# Patient Record
Sex: MALE | Race: OTHER | NOT HISPANIC OR LATINO | Employment: FULL TIME | URBAN - METROPOLITAN AREA
[De-identification: names, ages, dates, MRNs, and addresses within clinical notes are randomized per-mention and may not be internally consistent; named-entity substitution may affect disease eponyms.]

---

## 2021-04-16 ENCOUNTER — TELEPHONE (OUTPATIENT)
Dept: UROLOGY | Facility: MEDICAL CENTER | Age: 41
End: 2021-04-16

## 2021-04-16 DIAGNOSIS — N28.89 RENAL MASS, RIGHT: Primary | ICD-10-CM

## 2021-04-16 NOTE — TELEPHONE ENCOUNTER
Please Triage - [place]   New Patient-     What is the reason for the patients appointment? Patient called with a  (patient is Deaf)  Patient is having pain on right testicle  Pain is not constant pain   It comes and goes and when he has pain it radiates to the lower back  Patient would like to know how to proceed  He is available any time  Imaging/Lab Results:      Do we accept the patient's insurance or is the patient Self-Pay? Provider & Plan: [employer]  Member ID#: Has the patient had any previous urologist(s)?no       Have patient records been requested?no        Has the patient had any outside testing done?no       Does the patient have a personal history of cancer? No       Patient can be reached at :[phone] (I)

## 2021-04-22 NOTE — TELEPHONE ENCOUNTER
Patient called back with   He is schedule to see Jose Rahman @ [facility] on 5/14  An interperter has been scheduled to come to appt

## 2021-05-14 ENCOUNTER — OFFICE VISIT (OUTPATIENT)
Dept: UROLOGY | Facility: CLINIC | Age: 41
End: 2021-05-14
Payer: COMMERCIAL

## 2021-05-14 VITALS — DIASTOLIC BLOOD PRESSURE: 82 MMHG | HEART RATE: 72 BPM | SYSTOLIC BLOOD PRESSURE: 138 MMHG | WEIGHT: 192 LBS

## 2021-05-14 DIAGNOSIS — N50.811 RIGHT TESTICULAR PAIN: Primary | ICD-10-CM

## 2021-05-14 PROCEDURE — 99204 OFFICE O/P NEW MOD 45 MIN: CPT | Performed by: PHYSICIAN ASSISTANT

## 2021-05-14 NOTE — PROGRESS NOTES
5/14/2021      Chief Complaint   Patient presents with    New Patient Visit     Accompanied by ASL interpretor for the duration of visit- history physical and treatment plan discussion    Assessment and Plan    35 y o  male    1  Right testicular discomfort  - urine dip unremarkable  - US scrotum/testes  - US renal/bladder eval for hydro or distal stone but suspected less likely    Recommended prn nsaids, scrotal support, core stretch/strengthening for pain management  Will communicate with him his US results next week  I have low suspicion for significant pathology  History of Present Illness  Cuong Vazquez is a 35 y o  male here for evaluation of New patient right testicular pain  Sign language interpretor used for duration of visit  onset gradual   duration 6 months; intermittent; not daily 2-3/week   location right testicle    trigger- none   better- none   worse- standing long periods, or lifting   pain- 0/1 out of 10   prior- no   workup - none  Treatment- noneoccaionally take motrin, ice for the back pain; hot/cold compress  Occasional right low back pain too generally present at time of testicular discomfort but also back pain is present on its own at times  Urination is normal  Testicle is not swollen/tender to touch or color change  No recent or remote precipitating injury      Review of Systems   Constitutional: Negative for activity change, appetite change, chills, fever and unexpected weight change  HENT: Positive for hearing loss (deaf)  Eyes: Negative for visual disturbance  Respiratory: Negative  Negative for shortness of breath  Cardiovascular: Negative  Negative for chest pain  Gastrointestinal: Negative for abdominal pain, diarrhea, nausea and vomiting  Endocrine: Negative  Genitourinary: Positive for testicular pain   Negative for decreased urine volume, difficulty urinating, dysuria, flank pain, frequency, hematuria, penile pain, penile swelling, scrotal swelling and urgency  Musculoskeletal: Negative for back pain and gait problem  Skin: Negative  Allergic/Immunologic: Negative  Neurological: Negative  Hematological: Negative for adenopathy  Does not bruise/bleed easily  Vitals  Vitals:    05/14/21 1305   BP: 138/82   BP Location: Right arm   Patient Position: Sitting   Cuff Size: Adult   Pulse: 72   Weight: 87 1 kg (192 lb)       Physical Exam  Vitals signs and nursing note reviewed  Constitutional:       General: He is not in acute distress  Appearance: He is well-developed  He is not diaphoretic  HENT:      Head: Normocephalic and atraumatic  Comments: Deaf- communicates via [address]  todays visit  Pulmonary:      Effort: Pulmonary effort is normal    Abdominal:      Tenderness: There is no right CVA tenderness or left CVA tenderness  Hernia: No hernia is present  Genitourinary:     Comments: Circumcised penis, normal phallus, orthotopic patent meatus  Testes smooth descended bilaterally into the scrotum nontender with no palpable mass  No reproducible tenderness    Skin:     General: Skin is warm and dry  Findings: No rash  Neurological:      Mental Status: He is alert and oriented to person, place, and time  Gait: Gait normal    Psychiatric:         Speech: Speech normal          Behavior: Behavior normal            Past History  No past medical history on file    Social History     Socioeconomic History    Marital status: Unknown     Spouse name: Not on file    Number of children: Not on file    Years of education: Not on file    Highest education level: Not on file   Occupational History    Not on file   Social Needs    Financial resource strain: Not on file    Food insecurity     Worry: Not on file     Inability: Not on file    Transportation needs     Medical: Not on file     Non-medical: Not on file   Tobacco Use    Smoking status: Never Smoker    Smokeless tobacco: Never Used   Substance and Sexual Activity    Alcohol use: Not Currently    Drug use: Never    Sexual activity: Not on file   Lifestyle    Physical activity     Days per week: Not on file     Minutes per session: Not on file    Stress: Not on file   Relationships    Social connections     Talks on phone: Not on file     Gets together: Not on file     Attends Voodoo service: Not on file     Active member of club or organization: Not on file     Attends meetings of clubs or organizations: Not on file     Relationship status: Not on file    Intimate partner violence     Fear of current or ex partner: Not on file     Emotionally abused: Not on file     Physically abused: Not on file     Forced sexual activity: Not on file   Other Topics Concern    Not on file   Social History Narrative    Not on file     Social History     Tobacco Use   Smoking Status Never Smoker   Smokeless Tobacco Never Used     No family history on file  The following portions of the patient's history were reviewed and updated as appropriate: allergies, current medications, past medical history, past social history, past surgical history and problem list     Results  No results found for this or any previous visit (from the past 1 hour(s))  ]  No results found for: PSA  No results found for: GLUCOSE, CALCIUM, NA, K, CO2, CL, BUN, CREATININE  No results found for: WBC, HGB, HCT, MCV, PLT

## 2021-05-24 ENCOUNTER — HOSPITAL ENCOUNTER (OUTPATIENT)
Dept: ULTRASOUND IMAGING | Facility: HOSPITAL | Age: 41
Discharge: HOME | End: 2021-05-24
Payer: COMMERCIAL

## 2021-05-24 DIAGNOSIS — N50.811 RIGHT TESTICULAR PAIN: ICD-10-CM

## 2021-05-24 PROCEDURE — 76870 US EXAM SCROTUM: CPT

## 2021-05-24 PROCEDURE — 76770 US EXAM ABDO BACK WALL COMP: CPT

## 2021-06-03 NOTE — TELEPHONE ENCOUNTER
vn left for pt to call back and review AP recommendations   Also in VM noted that AP sent a message on my chart

## 2021-06-03 NOTE — TELEPHONE ENCOUNTER
US renal and scrotal reviewed  I sent patient a communication on [place] which may be easiest for him as he is hearing impaired  The scrotal US is normal  The renal ultrasound shows a right renal lesion needs further workup with MRI    I placed orders  Can we try to reach out with his  as prior to also let him know and make sure he received my message and help schedule thank you

## 2021-06-08 NOTE — TELEPHONE ENCOUNTER
Left message though the  number [phone]  Informed patient that he needs to obtain a MRI and left the number to schedule the MRI  Told patient that an lesion was seen on the resent ultrasound  His scrotal ultrasound was normal   We also sent this message though his mychart  He can call the office for further questions

## 2021-06-09 NOTE — TELEPHONE ENCOUNTER
LUCY for pt notifying he has a test that needs to be scheduled  Mailing pt the MRI script with Central scheduling's number, so pt can call when able

## 2021-06-11 ENCOUNTER — TELEPHONE (OUTPATIENT)
Dept: UROLOGY | Facility: MEDICAL CENTER | Age: 41
End: 2021-06-11

## 2021-06-11 NOTE — TELEPHONE ENCOUNTER
Regarding: RE:urology results  Contact: [phone]  ----- Message from Mehrdad Leo sent at 6/10/2021  2:17 PM EDT -----  Please help patient schedule MRI     ----- Message from Ledy Brown to Jeanie Murillo PA-C sent at 6/10/2021  7:37 AM -----   Hi,  Yes, I got the letter regarding it  Do I need to stop by the same [address] [place] [address] Dr again this month? I can do next Monday 14th or Tuesday 15th anytime  Please let me know  Thank you very much  Fish Jurado      ----- Message -----       From:Mili Olson PA-C       Sent:6/3/2021  1:14 PM EDT         To:Parker Rahman    Subject:urology results    Good afternoon Fish Jurado though I might reach you on here easier  Wanted to let you know your ultrasound testicles/scrotum looks normal  Small cyst on the left testicle is nothing to worry about  No masses or tumors and excellent blood flow  The kidney ultrasound no significant stones but the right side has a cyst vs fatty tumor hard to see on the ultrasound  The radiology team has recommended an MRI of the kidneys to rule out any more dangerous tumors  I will try calling today as well, just wanted you to have this written down for ease  Will communicate again soon        Mariaelena Duran  Urology [address] [facility]  [phone]

## 2021-07-15 ENCOUNTER — APPOINTMENT (OUTPATIENT)
Dept: LAB | Facility: HOSPITAL | Age: 41
End: 2021-07-15
Payer: COMMERCIAL

## 2021-07-15 ENCOUNTER — HOSPITAL ENCOUNTER (OUTPATIENT)
Dept: MRI IMAGING | Facility: HOSPITAL | Age: 41
Discharge: HOME | End: 2021-07-15
Payer: COMMERCIAL

## 2021-07-15 DIAGNOSIS — N28.89 RENAL MASS, RIGHT: ICD-10-CM

## 2021-07-15 LAB
ANION GAP SERPL CALCULATED.3IONS-SCNC: 7 MMOL/L (ref 4–13)
BUN SERPL-MCNC: 11 MG/DL (ref 5–25)
CALCIUM SERPL-MCNC: 8.7 MG/DL (ref 8.3–10.1)
CHLORIDE SERPL-SCNC: 105 MMOL/L (ref 100–108)
CO2 SERPL-SCNC: 30 MMOL/L (ref 21–32)
CREAT SERPL-MCNC: 1.05 MG/DL (ref 0.6–1.3)
GFR SERPL CREATININE-BSD FRML MDRD: 87 ML/MIN/1.73SQ M
GLUCOSE SERPL-MCNC: 105 MG/DL (ref 65–140)
POTASSIUM SERPL-SCNC: 3.9 MMOL/L (ref 3.5–5.3)
SODIUM SERPL-SCNC: 142 MMOL/L (ref 136–145)

## 2021-07-15 PROCEDURE — 80048 BASIC METABOLIC PNL TOTAL CA: CPT

## 2021-07-15 PROCEDURE — 36415 COLL VENOUS BLD VENIPUNCTURE: CPT

## 2021-07-15 NOTE — TELEPHONE ENCOUNTER
Call placed to [facility] from Radiology to see if it would be possible to order stat labs for patient and have them drawn prior to appointment  Unfortunately, labs would not be processed in time prior to 1:45 MRI appointment  Attempted to contact patient and patient's , however both numbers needed to be connected through a secondhand source and they were having trouble connecting  Unable to leave voicemail at this time  Will attempt to contact patient again to have MRI re-scheduled

## 2021-07-15 NOTE — TELEPHONE ENCOUNTER
Jaclyn from Radiology calling in regards to patients appointment for today at 1:45 pm   No BUN or [place] on file  Please advise how to proceed    Please call [phone]

## 2021-07-20 ENCOUNTER — HOSPITAL ENCOUNTER (OUTPATIENT)
Dept: MRI IMAGING | Facility: HOSPITAL | Age: 41
Discharge: HOME | End: 2021-07-20
Payer: COMMERCIAL

## 2021-07-20 PROCEDURE — A9585 GADOBUTROL INJECTION: HCPCS | Performed by: PHYSICIAN ASSISTANT

## 2021-07-20 PROCEDURE — G1004 CDSM NDSC: HCPCS

## 2021-07-20 PROCEDURE — 74183 MRI ABD W/O CNTR FLWD CNTR: CPT

## 2021-07-20 RX ADMIN — GADOBUTROL 8 ML: 604.72 INJECTION INTRAVENOUS at 11:17

## 2021-07-26 ENCOUNTER — TELEPHONE (OUTPATIENT)
Dept: UROLOGY | Facility: AMBULATORY SURGERY CENTER | Age: 41
End: 2021-07-26

## 2021-07-26 NOTE — TELEPHONE ENCOUNTER
Received call from radiology reading room to report significant findings on patient's 7-20-21 MRI      *Patient has no upcoming follow ups*

## 2021-07-27 NOTE — TELEPHONE ENCOUNTER
Please have pt schedule follow up appointment with on e of the physicians to discuss results of MRI and plan of care

## 2021-08-05 NOTE — TELEPHONE ENCOUNTER
Call placed to patient and mother, however both unavailable at this time  Will attempt at a later time

## 2021-08-09 NOTE — TELEPHONE ENCOUNTER
Called [phone] and left a message for patient stating to please contact the office to speak with clinical to schedule a MD follow up for MRI results  Appointment needs to be 30 minutes

## 2021-08-10 ENCOUNTER — TELEPHONE (OUTPATIENT)
Dept: UROLOGY | Facility: CLINIC | Age: 41
End: 2021-08-10

## 2021-08-10 NOTE — TELEPHONE ENCOUNTER
Patient scheduled with Dr Lesly Marx on 08/27/2021 at 1100 at the [facility] office  Please arrange for interpretor to be present for the visit    Thanks

## 2021-08-10 NOTE — TELEPHONE ENCOUNTER
Received phone call from  services  for patient  Patient scheduled 08/27/2021 at 1100 with Dr Raquel Miguel at the [facility] office  Encounter sent to clerical to arrange interpretor services for patient's visit

## 2021-08-10 NOTE — TELEPHONE ENCOUNTER
Spoke with Maryjane Gong at [address] [facility] and arranged for interpretor for visit

## 2021-08-10 NOTE — TELEPHONE ENCOUNTER
Pt called thru relay system stating he was unavailable 8/27 with Yesi Vasquez I informed him I could not accommodate with rescheduling

## 2021-08-10 NOTE — TELEPHONE ENCOUNTER
----- Message from Brenda Fernandez sent at 8/9/2021  2:34 PM EDT -----  Regarding: RE:MRI results  Contact: [phone]  Hello,  Thanks for the message  Yes, I would like to stop by the office next week, preferably on Monday or Tuesday  Anytime is fine with me  Thank you    Susi Zelaya

## 2021-08-11 NOTE — TELEPHONE ENCOUNTER
Called and left message with   Informed patient that I received his message about needing to reschedule appointment for 08/27 to 08/31  Office number left in message

## 2021-08-17 NOTE — TELEPHONE ENCOUNTER
Called [facility] at [phone] ext [phone] and spoke with Librado Cuenca and notified her of appointment change to 8/31/21 for a 11:15 arrival   Librado Cuenca explained that she would notifiy the  that was assigned to this case

## 2021-08-30 NOTE — PROGRESS NOTES
UROLOGY ROUTINE FOLLOW UP NOTE     CHIEF COMPLAINT   Lorraine Braga is a 35 y o  male with a complaint of No chief complaint on file  History of Present Illness:     35 y o  male with hearing impairment, accompanied by ASL   Patient intially presented with testicular pain  Patient had a scrotal and renal ultrasound as part of his index workup  Renal ultrasound demonstrated concern for a potential angiomyolipoma of the right kidney  MRI was performed in follow-up  Unfortunately this study is significantly limited due to respiratory motion  He presents today to discuss the results  He is no longer having significant discomfort or difficulty with his testicles and groin  Past Medical History:   No past medical history on file  PAST SURGICAL HISTORY:   No past surgical history on file  CURRENT MEDICATIONS:     No current outpatient medications on file  No current facility-administered medications for this visit  ALLERGIES:   No Known Allergies    SOCIAL HISTORY:     Social History     Socioeconomic History    Marital status: Single     Spouse name: Not on file    Number of children: Not on file    Years of education: Not on file    Highest education level: Not on file   Occupational History    Not on file   Tobacco Use    Smoking status: Never Smoker    Smokeless tobacco: Never Used   Vaping Use    Vaping Use: Never used   Substance and Sexual Activity    Alcohol use: Not Currently    Drug use: Never    Sexual activity: Not on file   Other Topics Concern    Not on file   Social History Narrative    Not on file     Social Determinants of Health     Financial Resource Strain:     Difficulty of Paying Living Expenses:    Food Insecurity:     Worried About Running Out of Food in the Last Year:     [address] in the Last Year:    Transportation Needs:     Lack of Transportation (Medical):      Lack of Transportation (Non-Medical):    Physical Activity:     Days of Exercise per Week:     Minutes of Exercise per Session:    Stress:     Feeling of Stress :    Social Connections:     Frequency of Communication with Friends and Family:     Frequency of Social Gatherings with Friends and Family:     Attends Evangelical Services:     Active Member of Clubs or Organizations:     Attends Club or Organization Meetings:     Marital Status:    Intimate Partner Violence:     Fear of Current or Ex-Partner:     Emotionally Abused:     Physically Abused:     Sexually Abused:        SOCIAL HISTORY:   No family history on file  REVIEW OF SYSTEMS:     Review of Systems   Constitutional: Negative  Respiratory: Negative  Cardiovascular: Negative  Gastrointestinal: Negative  Genitourinary: Negative  Musculoskeletal: Negative  Skin: Negative  Psychiatric/Behavioral: Negative  PHYSICAL EXAM:     There were no vitals taken for this visit  General:  Healthy appearing male in no acute distress  They have a normal affect  Hearing impaired  HEENT:  Normocephalic, atraumatic  Neck is supple without any palpable lymphadenopathy  Cardiovascular:  Patient has normal palpable distal radial pulses  There is no significant peripheral edema  No JVD is noted  Respiratory:  Patient has unlabored respirations  There is no audible wheeze or rhonchi  Abdomen:   Abdomen is soft and nontender  There is no tympany  Inguinal and umbilical hernia are not appreciated  Musculoskeletal:  Patient does not have significant CVA tenderness in the  flank with palpation or percussion  They full range of motion in all 4 extremities  Strength in all 4 extremities appears congruent  Patient is able to ambulate without assistance or difficulty  Dermatologic:  Patient has no skin abnormalities or rashes        LABS:     CBC: No results found for: WBC, HGB, HCT, MCV, PLT    BMP:   Lab Results   Component Value Date    CALCIUM 8 7 07/15/2021    K 3 9 07/15/2021    CO2 30 07/15/2021     07/15/2021    BUN 11 07/15/2021    CREATININE 1 05 07/15/2021       IMAGIN/20/21  MRI - ABDOMEN - WITH AND WITHOUT CONTRAST     INDICATION:  Evaluate right renal mass seen on ultrasound      COMPARISON: Renal ultrasound 2021     TECHNIQUE:  The following pulse sequences were obtained prior to and following the administration of intravenous contrast:  Coronal and axial T2 with TE of 90 and 180 respectively, axial T2 with fat saturation, axial FIESTA fat-sat, axial T1-weighted   in-and-out-of phase, axial DWI/ADC, precontrast axial T1 with fat saturation, post-contrast dynamic axial T1 with fat saturation at 20, 70, and 180 seconds, coronal T1 with fat saturation and 7 minute delayed axial T1 with fat saturation  Pre- and   postcontrast subtraction images were also obtained  Coronal MRCP sequence was also provided      IV Contrast:  8 mL of Gadobutrol injection (SINGLE-DOSE)      FINDINGS:     Unfortunately, the study is degraded by respiratory motion      LOWER CHEST:   Unremarkable      LIVER:   Normal in size and configuration  No suspicious mass  Small hepatic cysts are seen  The hepatic veins and portal veins are patent      BILE DUCTS: No intrahepatic or extrahepatic bile duct dilation       GALLBLADDER:  Cholelithiasis      PANCREAS: Normal  No main pancreatic ductal dilation      ADRENAL GLANDS:  Normal      SPLEEN:  Normal      KIDNEYS/PROXIMAL URETERS:   In the anterior upper pole right kidney is a 1 6 x 1 6 cm slightly T2 hyperintense nodule corresponding to recent ultrasound finding  The nodule is iso- to hypointense on T1 and demonstrates internal enhancement on postcontrast imaging  There is no   definitive macroscopic or microscopic fat appreciated within the lesion although images are again degraded by respiratory motion      Tiny cortical cyst mid to lower left kidney    Lower pole left renal calculus suggested on prior ultrasound may not be visible by MRI      BOWEL:   No dilated loops of bowel       PERITONEUM/RETROPERITONEUM: No ascites      LYMPH NODES: No pathologic lymphadenopathy      VASCULAR STRUCTURES:  No aneurysm      ABDOMINAL WALL:  Unremarkable      OSSEOUS STRUCTURES:  No suspicious osseous lesion         IMPRESSION:     Unfortunately, the study is significantly limited by respiratory motion  1 6 cm upper pole right renal nodule remains indeterminate, however, does demonstrate internal enhancement consistent with at least a partially solid lesion  Possibility of renal   cell carcinoma remains in the differential as does lipid poor angiomyolipoma, adenoma as well as oncocytoma  I would recommend a follow-up CT abdomen with IV contrast in 3 months utilizing renal protocol as CT is less susceptible to respiratory motion      Tiny hepatic and left renal cysts      Cholelithiasis  5/24/21  RENAL ULTRASOUND     INDICATION:   N50 811: Right testicular pain      COMPARISON: None     TECHNIQUE:   Ultrasound of the retroperitoneum was performed with a curvilinear transducer utilizing volumetric sweeps and still imaging techniques       FINDINGS:     KIDNEYS:  Symmetric and normal size  Right kidney:  11 3 cm  Left kidney:  11 7 cm      Right kidney  Normal echogenicity and contour  Nonshadowing avascular hyperechoic focus in the upper pole cortex measuring 1 7 x 1 8 x 1 6 cm  No hydronephrosis  No shadowing calculi  No perinephric fluid collections      Left kidney  Normal echogenicity and contour  No suspicious masses detected  No hydronephrosis  4 mm calculus in the lower pole  No perinephric fluid collections      URETERS:  Nonvisualized      BLADDER:   Normally distended  No focal thickening or mass lesions    Bilateral ureteral jets detected         IMPRESSION:     1 8 cm echogenic lesion in the right kidney, suggestive of angiomyolipoma however recommend follow-up MRI to exclude fat-containing renal cell carcinoma      Nonobstructing left renal calculus  ASSESSMENT:     35 y o  male with   1 8 cm right renal lesion with suspicion for benign angiomyolipoma    PLAN:     The MRI was complicated by respiratory motion  There is question of enhancement however there continues to be the issue and concern raised for a benign pathology such is oncocytoma or angiomyolipoma  As such, I have recommended against surgical scheduling at this time and agree that a repeat image in 3 months is warranted  CT scan will have less variability with motion which may be challenging for the patient given his hearing impairment  I would see him back after the updated CT scan

## 2021-08-31 ENCOUNTER — TELEPHONE (OUTPATIENT)
Dept: UROLOGY | Facility: CLINIC | Age: 41
End: 2021-08-31

## 2021-08-31 ENCOUNTER — OFFICE VISIT (OUTPATIENT)
Dept: UROLOGY | Facility: CLINIC | Age: 41
End: 2021-08-31
Payer: COMMERCIAL

## 2021-08-31 VITALS — HEART RATE: 72 BPM | SYSTOLIC BLOOD PRESSURE: 122 MMHG | DIASTOLIC BLOOD PRESSURE: 82 MMHG | WEIGHT: 205 LBS

## 2021-08-31 DIAGNOSIS — N28.89 RENAL MASS, RIGHT: Primary | ICD-10-CM

## 2021-08-31 PROCEDURE — 99214 OFFICE O/P EST MOD 30 MIN: CPT | Performed by: UROLOGY

## 2021-11-23 ENCOUNTER — APPOINTMENT (OUTPATIENT)
Dept: LAB | Facility: HOSPITAL | Age: 41
End: 2021-11-23
Attending: UROLOGY
Payer: COMMERCIAL

## 2021-11-23 DIAGNOSIS — N28.89 RENAL MASS, RIGHT: ICD-10-CM

## 2021-11-23 LAB
ANION GAP SERPL CALCULATED.3IONS-SCNC: 8 MMOL/L (ref 4–13)
BUN SERPL-MCNC: 15 MG/DL (ref 5–25)
CALCIUM SERPL-MCNC: 8.7 MG/DL (ref 8.3–10.1)
CHLORIDE SERPL-SCNC: 105 MMOL/L (ref 100–108)
CO2 SERPL-SCNC: 29 MMOL/L (ref 21–32)
CREAT SERPL-MCNC: 1.04 MG/DL (ref 0.6–1.3)
GFR SERPL CREATININE-BSD FRML MDRD: 88 ML/MIN/1.73SQ M
GLUCOSE SERPL-MCNC: 108 MG/DL (ref 65–140)
POTASSIUM SERPL-SCNC: 4.5 MMOL/L (ref 3.5–5.3)
SODIUM SERPL-SCNC: 142 MMOL/L (ref 136–145)

## 2021-11-23 PROCEDURE — 80048 BASIC METABOLIC PNL TOTAL CA: CPT

## 2021-11-23 PROCEDURE — 36415 COLL VENOUS BLD VENIPUNCTURE: CPT

## 2021-11-30 ENCOUNTER — HOSPITAL ENCOUNTER (OUTPATIENT)
Dept: CT IMAGING | Facility: HOSPITAL | Age: 41
Discharge: HOME | End: 2021-11-30
Attending: UROLOGY
Payer: COMMERCIAL

## 2021-11-30 DIAGNOSIS — N28.89 RENAL MASS, RIGHT: ICD-10-CM

## 2021-11-30 PROCEDURE — G1004 CDSM NDSC: HCPCS

## 2021-11-30 PROCEDURE — 74170 CT ABD WO CNTRST FLWD CNTRST: CPT

## 2021-11-30 RX ADMIN — IOHEXOL 100 ML: 350 INJECTION, SOLUTION INTRAVENOUS at 08:28

## 2021-12-03 ENCOUNTER — TELEPHONE (OUTPATIENT)
Dept: UROLOGY | Facility: AMBULATORY SURGERY CENTER | Age: 41
End: 2021-12-03

## 2021-12-03 NOTE — TELEPHONE ENCOUNTER
Please switch to ZP schedule for Tuesday 12/7   i'll swap him his 945 patient so there is a space for ZP to see El [place] for CT results thanks

## 2021-12-07 ENCOUNTER — APPOINTMENT (OUTPATIENT)
Dept: RADIOLOGY | Facility: MEDICAL CENTER | Age: 41
End: 2021-12-07
Payer: COMMERCIAL

## 2021-12-07 ENCOUNTER — OFFICE VISIT (OUTPATIENT)
Dept: UROLOGY | Facility: CLINIC | Age: 41
End: 2021-12-07
Payer: COMMERCIAL

## 2021-12-07 VITALS
SYSTOLIC BLOOD PRESSURE: 128 MMHG | BODY MASS INDEX: 25.8 KG/M2 | HEART RATE: 82 BPM | WEIGHT: 201 LBS | HEIGHT: 74 IN | DIASTOLIC BLOOD PRESSURE: 88 MMHG

## 2021-12-07 DIAGNOSIS — N28.89 RIGHT RENAL MASS: ICD-10-CM

## 2021-12-07 DIAGNOSIS — N28.89 RENAL MASS, RIGHT: ICD-10-CM

## 2021-12-07 DIAGNOSIS — N28.89 RIGHT RENAL MASS: Primary | ICD-10-CM

## 2021-12-07 PROCEDURE — 71046 X-RAY EXAM CHEST 2 VIEWS: CPT

## 2021-12-07 PROCEDURE — 99215 OFFICE O/P EST HI 40 MIN: CPT | Performed by: UROLOGY

## 2021-12-07 RX ORDER — RIBOFLAVIN (VITAMIN B2) 100 MG
100 TABLET ORAL DAILY
COMMUNITY

## 2021-12-07 RX ORDER — DIPHENOXYLATE HYDROCHLORIDE AND ATROPINE SULFATE 2.5; .025 MG/1; MG/1
1 TABLET ORAL DAILY
COMMUNITY

## 2021-12-07 RX ORDER — CEFAZOLIN SODIUM 2 G/50ML
2000 SOLUTION INTRAVENOUS ONCE
Status: CANCELLED | OUTPATIENT
Start: 2022-04-25 | End: 2021-12-07

## 2021-12-07 RX ORDER — MULTIVIT WITH MINERALS/LUTEIN
1000 TABLET ORAL DAILY
COMMUNITY

## 2022-01-12 ENCOUNTER — TELEPHONE (OUTPATIENT)
Dept: UROLOGY | Facility: CLINIC | Age: 42
End: 2022-01-12

## 2022-01-12 NOTE — TELEPHONE ENCOUNTER
2nd message left on voice mail for patient to call me back to schedule his surgery  Patient to call me at [phone]

## 2022-01-19 NOTE — TELEPHONE ENCOUNTER
Called patient and left a voicemail for patient to call me back to schedule their surgery  Patient to call me at [phone]

## 2022-01-24 ENCOUNTER — PREP FOR PROCEDURE (OUTPATIENT)
Dept: UROLOGY | Facility: CLINIC | Age: 42
End: 2022-01-24

## 2022-01-24 DIAGNOSIS — N28.89 RIGHT RENAL MASS: Primary | ICD-10-CM

## 2022-01-24 NOTE — TELEPHONE ENCOUNTER
Called and spoke with patient to schedule sx  Patient informed of all PAT's needed prior to sx and advised to stop any anticoagulant medication including Multi-vitamins, Fish oil, Krill oil and NSAID, Patient has also been inform that the hospital will call the day before sx with arrival time and procedure time  Patient also informed to have a  bring them to and from hospital     Sx Date: 04/25  Location: [facility]  Physician: BARTOLO/ Jeevan  H&P Date:    Clearance Date: called Dr Paul Alvarenga office to arrange Appointment/ left message with answering service    Consent: signed  Pre-cert Added to  list on : 01/24    PAT's Ordered: Cbc/ cmp/ ptt/ inr/t/s/ ucx/ ekg/ RBC to be done 4/11    Covid test ordered and need to be done on 4/21

## 2022-02-04 NOTE — TELEPHONE ENCOUNTER
Just spoke with/ Dr Jensen Ford office and scheduled medical clearance for 03/28/2022 at 10:20 am  I will call patient to inform of time and date of Appointment

## 2022-04-11 ENCOUNTER — ANESTHESIA EVENT (OUTPATIENT)
Dept: PERIOP | Facility: HOSPITAL | Age: 42
DRG: 658 | End: 2022-04-11
Payer: COMMERCIAL

## 2022-04-11 ENCOUNTER — HOSPITAL ENCOUNTER (OUTPATIENT)
Dept: NON INVASIVE DIAGNOSTICS | Facility: HOSPITAL | Age: 42
Discharge: HOME | End: 2022-04-11
Attending: UROLOGY
Payer: COMMERCIAL

## 2022-04-11 ENCOUNTER — LAB (OUTPATIENT)
Dept: LAB | Facility: HOSPITAL | Age: 42
End: 2022-04-11
Attending: UROLOGY
Payer: COMMERCIAL

## 2022-04-11 DIAGNOSIS — N28.89 RIGHT RENAL MASS: ICD-10-CM

## 2022-04-11 LAB
ABO GROUP BLD: NORMAL
ALBUMIN SERPL BCP-MCNC: 3.8 G/DL (ref 3.5–5)
ALP SERPL-CCNC: 48 U/L (ref 46–116)
ALT SERPL W P-5'-P-CCNC: 19 U/L (ref 12–78)
ANION GAP SERPL CALCULATED.3IONS-SCNC: 8 MMOL/L (ref 4–13)
APTT PPP: 28 SECONDS (ref 23–37)
AST SERPL W P-5'-P-CCNC: 14 U/L (ref 5–45)
ATRIAL RATE: 73 BPM
BASOPHILS # BLD AUTO: 0.03 THOUSANDS/ΜL (ref 0–0.1)
BASOPHILS NFR BLD AUTO: 1 % (ref 0–1)
BILIRUB SERPL-MCNC: 0.76 MG/DL (ref 0.2–1)
BLD GP AB SCN SERPL QL: NEGATIVE
BUN SERPL-MCNC: 12 MG/DL (ref 5–25)
CALCIUM SERPL-MCNC: 8.5 MG/DL (ref 8.3–10.1)
CHLORIDE SERPL-SCNC: 103 MMOL/L (ref 100–108)
CO2 SERPL-SCNC: 29 MMOL/L (ref 21–32)
CREAT SERPL-MCNC: 1 MG/DL (ref 0.6–1.3)
EOSINOPHIL # BLD AUTO: 0.15 THOUSAND/ΜL (ref 0–0.61)
EOSINOPHIL NFR BLD AUTO: 3 % (ref 0–6)
ERYTHROCYTE [DISTWIDTH] IN BLOOD BY AUTOMATED COUNT: 11.9 % (ref 11.6–15.1)
GFR SERPL CREATININE-BSD FRML MDRD: 91 ML/MIN/1.73SQ M
GLUCOSE SERPL-MCNC: 103 MG/DL (ref 65–140)
HCT VFR BLD AUTO: 41.5 % (ref 36.5–49.3)
HGB BLD-MCNC: 14.4 G/DL (ref 12–17)
IMM GRANULOCYTES # BLD AUTO: 0.01 THOUSAND/UL (ref 0–0.2)
IMM GRANULOCYTES NFR BLD AUTO: 0 % (ref 0–2)
INR PPP: 0.97 (ref 0.84–1.19)
LYMPHOCYTES # BLD AUTO: 1.91 THOUSANDS/ΜL (ref 0.6–4.47)
LYMPHOCYTES NFR BLD AUTO: 40 % (ref 14–44)
MCH RBC QN AUTO: 30.3 PG (ref 26.8–34.3)
MCHC RBC AUTO-ENTMCNC: 34.7 G/DL (ref 31.4–37.4)
MCV RBC AUTO: 87 FL (ref 82–98)
MONOCYTES # BLD AUTO: 0.75 THOUSAND/ΜL (ref 0.17–1.22)
MONOCYTES NFR BLD AUTO: 16 % (ref 4–12)
NEUTROPHILS # BLD AUTO: 1.99 THOUSANDS/ΜL (ref 1.85–7.62)
NEUTS SEG NFR BLD AUTO: 40 % (ref 43–75)
NRBC BLD AUTO-RTO: 0 /100 WBCS
P AXIS: 38 DEGREES
PLATELET # BLD AUTO: 193 THOUSANDS/UL (ref 149–390)
PMV BLD AUTO: 10.2 FL (ref 8.9–12.7)
POTASSIUM SERPL-SCNC: 3.9 MMOL/L (ref 3.5–5.3)
PR INTERVAL: 170 MS
PROT SERPL-MCNC: 7.2 G/DL (ref 6.4–8.2)
PROTHROMBIN TIME: 12.6 SECONDS (ref 11.6–14.5)
QRS AXIS: -65 DEGREES
QRSD INTERVAL: 90 MS
QT INTERVAL: 390 MS
QTC INTERVAL: 429 MS
RBC # BLD AUTO: 4.75 MILLION/UL (ref 3.88–5.62)
RH BLD: POSITIVE
SODIUM SERPL-SCNC: 140 MMOL/L (ref 136–145)
SPECIMEN EXPIRATION DATE: NORMAL
T WAVE AXIS: 58 DEGREES
VENTRICULAR RATE: 73 BPM
WBC # BLD AUTO: 4.84 THOUSAND/UL (ref 4.31–10.16)

## 2022-04-11 PROCEDURE — 80053 COMPREHEN METABOLIC PANEL: CPT

## 2022-04-11 PROCEDURE — 87086 URINE CULTURE/COLONY COUNT: CPT

## 2022-04-11 PROCEDURE — 85610 PROTHROMBIN TIME: CPT

## 2022-04-11 PROCEDURE — 36415 COLL VENOUS BLD VENIPUNCTURE: CPT

## 2022-04-11 PROCEDURE — 93010 ELECTROCARDIOGRAM REPORT: CPT | Performed by: INTERNAL MEDICINE

## 2022-04-11 PROCEDURE — 85730 THROMBOPLASTIN TIME PARTIAL: CPT

## 2022-04-11 PROCEDURE — 86900 BLOOD TYPING SEROLOGIC ABO: CPT

## 2022-04-11 PROCEDURE — 86901 BLOOD TYPING SEROLOGIC RH(D): CPT

## 2022-04-11 PROCEDURE — 85025 COMPLETE CBC W/AUTO DIFF WBC: CPT

## 2022-04-11 PROCEDURE — 93005 ELECTROCARDIOGRAM TRACING: CPT

## 2022-04-11 PROCEDURE — 86850 RBC ANTIBODY SCREEN: CPT

## 2022-04-11 NOTE — PRE-PROCEDURE INSTRUCTIONS
Pre-Surgery Instructions:   Medication Instructions    Ascorbic Acid (vitamin C) 100 MG tablet Instructed patient per Anesthesia Guidelines  stopped for surgery    multivitamin (THERAGRAN) TABS Instructed patient per Anesthesia Guidelines  stopped for surgery    vitamin E, tocopherol, 1,000 units capsule Instructed patient per Anesthesia Guidelines  stopped for surgery   Pre procedure instructions given, verbalizes understanding  NPO after MN  Bathing reviewed  Interview via   [facility] notified of services needed dos  [facility] notified of surgical date  Stop supplements/vitamins   No NSAIDS

## 2022-04-12 LAB — BACTERIA UR CULT: NORMAL

## 2022-04-21 ENCOUNTER — TELEPHONE (OUTPATIENT)
Dept: OTHER | Facility: OTHER | Age: 42
End: 2022-04-21

## 2022-04-21 DIAGNOSIS — N28.9 LESION OF RIGHT NATIVE KIDNEY: Primary | ICD-10-CM

## 2022-04-21 PROCEDURE — U0005 INFEC AGEN DETEC AMPLI PROBE: HCPCS | Performed by: UROLOGY

## 2022-04-21 PROCEDURE — U0003 INFECTIOUS AGENT DETECTION BY NUCLEIC ACID (DNA OR RNA); SEVERE ACUTE RESPIRATORY SYNDROME CORONAVIRUS 2 (SARS-COV-2) (CORONAVIRUS DISEASE [COVID-19]), AMPLIFIED PROBE TECHNIQUE, MAKING USE OF HIGH THROUGHPUT TECHNOLOGIES AS DESCRIBED BY CMS-2020-01-R: HCPCS | Performed by: UROLOGY

## 2022-04-21 NOTE — TELEPHONE ENCOUNTER
Patient's PAT Covid lab order was cancelled and Elizabet Trammell from [facility] called to have lab re-entered in system for patient to have test done while he is at the lab  Patient is mute and personnel were having trouble communicating with patient  Lab ordered re-entered

## 2022-04-22 LAB — SARS-COV-2 RNA RESP QL NAA+PROBE: NEGATIVE

## 2022-04-25 ENCOUNTER — HOSPITAL ENCOUNTER (INPATIENT)
Facility: HOSPITAL | Age: 42
LOS: 1 days | Discharge: HOME | DRG: 658 | End: 2022-04-26
Attending: UROLOGY | Admitting: UROLOGY
Payer: COMMERCIAL

## 2022-04-25 ENCOUNTER — ANESTHESIA (OUTPATIENT)
Dept: PERIOP | Facility: HOSPITAL | Age: 42
DRG: 658 | End: 2022-04-25
Payer: COMMERCIAL

## 2022-04-25 DIAGNOSIS — N28.89 RIGHT RENAL MASS: ICD-10-CM

## 2022-04-25 LAB
ABO GROUP BLD: NORMAL
RH BLD: POSITIVE

## 2022-04-25 PROCEDURE — S2900 ROBOTIC SURGICAL SYSTEM: HCPCS | Performed by: UROLOGY

## 2022-04-25 PROCEDURE — 88341 IMHCHEM/IMCYTCHM EA ADD ANTB: CPT | Performed by: PATHOLOGY

## 2022-04-25 PROCEDURE — 8E0W4CZ ROBOTIC ASSISTED PROCEDURE OF TRUNK REGION, PERCUTANEOUS ENDOSCOPIC APPROACH: ICD-10-PCS | Performed by: UROLOGY

## 2022-04-25 PROCEDURE — 50543 LAPARO PARTIAL NEPHRECTOMY: CPT | Performed by: UROLOGY

## 2022-04-25 PROCEDURE — 86920 COMPATIBILITY TEST SPIN: CPT

## 2022-04-25 PROCEDURE — 76998 US GUIDE INTRAOP: CPT | Performed by: UROLOGY

## 2022-04-25 PROCEDURE — 88307 TISSUE EXAM BY PATHOLOGIST: CPT | Performed by: PATHOLOGY

## 2022-04-25 PROCEDURE — 0TT04ZZ RESECTION OF RIGHT KIDNEY, PERCUTANEOUS ENDOSCOPIC APPROACH: ICD-10-PCS | Performed by: UROLOGY

## 2022-04-25 PROCEDURE — 88342 IMHCHEM/IMCYTCHM 1ST ANTB: CPT | Performed by: PATHOLOGY

## 2022-04-25 PROCEDURE — NC001 PR NO CHARGE: Performed by: PHYSICIAN ASSISTANT

## 2022-04-25 RX ORDER — MIDAZOLAM HYDROCHLORIDE 2 MG/2ML
INJECTION, SOLUTION INTRAMUSCULAR; INTRAVENOUS AS NEEDED
Status: DISCONTINUED | OUTPATIENT
Start: 2022-04-25 | End: 2022-04-25

## 2022-04-25 RX ORDER — PROMETHAZINE HYDROCHLORIDE 25 MG/ML
6.25 INJECTION, SOLUTION INTRAMUSCULAR; INTRAVENOUS ONCE AS NEEDED
Status: DISCONTINUED | OUTPATIENT
Start: 2022-04-25 | End: 2022-04-25 | Stop reason: HOSPADM

## 2022-04-25 RX ORDER — ROCURONIUM BROMIDE 10 MG/ML
INJECTION, SOLUTION INTRAVENOUS AS NEEDED
Status: DISCONTINUED | OUTPATIENT
Start: 2022-04-25 | End: 2022-04-25

## 2022-04-25 RX ORDER — SIMETHICONE 80 MG
80 TABLET,CHEWABLE ORAL 4 TIMES DAILY PRN
Status: DISCONTINUED | OUTPATIENT
Start: 2022-04-25 | End: 2022-04-26 | Stop reason: HOSPADM

## 2022-04-25 RX ORDER — FENTANYL CITRATE 50 UG/ML
INJECTION, SOLUTION INTRAMUSCULAR; INTRAVENOUS AS NEEDED
Status: DISCONTINUED | OUTPATIENT
Start: 2022-04-25 | End: 2022-04-25

## 2022-04-25 RX ORDER — ACETAMINOPHEN 325 MG/1
650 TABLET ORAL EVERY 6 HOURS SCHEDULED
Status: DISCONTINUED | OUTPATIENT
Start: 2022-04-25 | End: 2022-04-26 | Stop reason: HOSPADM

## 2022-04-25 RX ORDER — DEXAMETHASONE SODIUM PHOSPHATE 10 MG/ML
INJECTION, SOLUTION INTRAMUSCULAR; INTRAVENOUS AS NEEDED
Status: DISCONTINUED | OUTPATIENT
Start: 2022-04-25 | End: 2022-04-25

## 2022-04-25 RX ORDER — PROPOFOL 10 MG/ML
INJECTION, EMULSION INTRAVENOUS AS NEEDED
Status: DISCONTINUED | OUTPATIENT
Start: 2022-04-25 | End: 2022-04-25

## 2022-04-25 RX ORDER — GABAPENTIN 300 MG/1
300 CAPSULE ORAL
Status: DISCONTINUED | OUTPATIENT
Start: 2022-04-25 | End: 2022-04-26 | Stop reason: HOSPADM

## 2022-04-25 RX ORDER — CEFAZOLIN SODIUM 1 G/50ML
1000 SOLUTION INTRAVENOUS EVERY 8 HOURS
Status: COMPLETED | OUTPATIENT
Start: 2022-04-25 | End: 2022-04-25

## 2022-04-25 RX ORDER — OXYCODONE HYDROCHLORIDE 10 MG/1
10 TABLET ORAL EVERY 4 HOURS PRN
Status: DISCONTINUED | OUTPATIENT
Start: 2022-04-25 | End: 2022-04-26 | Stop reason: HOSPADM

## 2022-04-25 RX ORDER — OXYCODONE HYDROCHLORIDE 5 MG/1
5 TABLET ORAL EVERY 4 HOURS PRN
Status: DISCONTINUED | OUTPATIENT
Start: 2022-04-25 | End: 2022-04-26 | Stop reason: HOSPADM

## 2022-04-25 RX ORDER — SODIUM CHLORIDE, SODIUM LACTATE, POTASSIUM CHLORIDE, CALCIUM CHLORIDE 600; 310; 30; 20 MG/100ML; MG/100ML; MG/100ML; MG/100ML
75 INJECTION, SOLUTION INTRAVENOUS CONTINUOUS
Status: DISCONTINUED | OUTPATIENT
Start: 2022-04-25 | End: 2022-04-26 | Stop reason: HOSPADM

## 2022-04-25 RX ORDER — HYDROMORPHONE HCL/PF 1 MG/ML
SYRINGE (ML) INJECTION AS NEEDED
Status: DISCONTINUED | OUTPATIENT
Start: 2022-04-25 | End: 2022-04-25

## 2022-04-25 RX ORDER — SODIUM CHLORIDE 9 MG/ML
125 INJECTION, SOLUTION INTRAVENOUS CONTINUOUS
Status: DISCONTINUED | OUTPATIENT
Start: 2022-04-25 | End: 2022-04-25

## 2022-04-25 RX ORDER — HYDROMORPHONE HCL/PF 1 MG/ML
0.5 SYRINGE (ML) INJECTION
Status: DISCONTINUED | OUTPATIENT
Start: 2022-04-25 | End: 2022-04-25 | Stop reason: HOSPADM

## 2022-04-25 RX ORDER — HYDROMORPHONE HCL/PF 1 MG/ML
1 SYRINGE (ML) INJECTION
Status: DISCONTINUED | OUTPATIENT
Start: 2022-04-25 | End: 2022-04-26 | Stop reason: HOSPADM

## 2022-04-25 RX ORDER — FENTANYL CITRATE/PF 50 MCG/ML
50 SYRINGE (ML) INJECTION
Status: DISCONTINUED | OUTPATIENT
Start: 2022-04-25 | End: 2022-04-25 | Stop reason: HOSPADM

## 2022-04-25 RX ORDER — BUPIVACAINE HYDROCHLORIDE 5 MG/ML
INJECTION, SOLUTION PERINEURAL AS NEEDED
Status: DISCONTINUED | OUTPATIENT
Start: 2022-04-25 | End: 2022-04-25 | Stop reason: HOSPADM

## 2022-04-25 RX ORDER — GLYCOPYRROLATE 0.2 MG/ML
INJECTION INTRAMUSCULAR; INTRAVENOUS AS NEEDED
Status: DISCONTINUED | OUTPATIENT
Start: 2022-04-25 | End: 2022-04-25

## 2022-04-25 RX ORDER — CEFAZOLIN SODIUM 2 G/50ML
2000 SOLUTION INTRAVENOUS ONCE
Status: COMPLETED | OUTPATIENT
Start: 2022-04-25 | End: 2022-04-25

## 2022-04-25 RX ORDER — SENNOSIDES 8.6 MG
1 TABLET ORAL DAILY
Status: DISCONTINUED | OUTPATIENT
Start: 2022-04-25 | End: 2022-04-26 | Stop reason: HOSPADM

## 2022-04-25 RX ORDER — SODIUM CHLORIDE 9 MG/ML
INJECTION, SOLUTION INTRAVENOUS CONTINUOUS PRN
Status: DISCONTINUED | OUTPATIENT
Start: 2022-04-25 | End: 2022-04-25

## 2022-04-25 RX ORDER — NEOSTIGMINE METHYLSULFATE 1 MG/ML
INJECTION INTRAVENOUS AS NEEDED
Status: DISCONTINUED | OUTPATIENT
Start: 2022-04-25 | End: 2022-04-25

## 2022-04-25 RX ORDER — ONDANSETRON 2 MG/ML
4 INJECTION INTRAMUSCULAR; INTRAVENOUS ONCE AS NEEDED
Status: DISCONTINUED | OUTPATIENT
Start: 2022-04-25 | End: 2022-04-25 | Stop reason: HOSPADM

## 2022-04-25 RX ORDER — MAGNESIUM HYDROXIDE 1200 MG/15ML
LIQUID ORAL AS NEEDED
Status: DISCONTINUED | OUTPATIENT
Start: 2022-04-25 | End: 2022-04-25 | Stop reason: HOSPADM

## 2022-04-25 RX ORDER — ONDANSETRON 2 MG/ML
4 INJECTION INTRAMUSCULAR; INTRAVENOUS EVERY 6 HOURS PRN
Status: DISCONTINUED | OUTPATIENT
Start: 2022-04-25 | End: 2022-04-26 | Stop reason: HOSPADM

## 2022-04-25 RX ORDER — TAMSULOSIN HYDROCHLORIDE 0.4 MG/1
0.4 CAPSULE ORAL
Status: DISCONTINUED | OUTPATIENT
Start: 2022-04-25 | End: 2022-04-26 | Stop reason: HOSPADM

## 2022-04-25 RX ORDER — ONDANSETRON 2 MG/ML
INJECTION INTRAMUSCULAR; INTRAVENOUS AS NEEDED
Status: DISCONTINUED | OUTPATIENT
Start: 2022-04-25 | End: 2022-04-25

## 2022-04-25 RX ORDER — INDOCYANINE GREEN AND WATER 25 MG
KIT INJECTION AS NEEDED
Status: DISCONTINUED | OUTPATIENT
Start: 2022-04-25 | End: 2022-04-25

## 2022-04-25 RX ORDER — LIDOCAINE HYDROCHLORIDE 20 MG/ML
INJECTION, SOLUTION EPIDURAL; INFILTRATION; INTRACAUDAL; PERINEURAL AS NEEDED
Status: DISCONTINUED | OUTPATIENT
Start: 2022-04-25 | End: 2022-04-25

## 2022-04-25 RX ORDER — HEPARIN SODIUM 5000 [USP'U]/ML
5000 INJECTION, SOLUTION INTRAVENOUS; SUBCUTANEOUS EVERY 8 HOURS SCHEDULED
Status: DISCONTINUED | OUTPATIENT
Start: 2022-04-26 | End: 2022-04-26 | Stop reason: HOSPADM

## 2022-04-25 RX ADMIN — CEFAZOLIN SODIUM 1000 MG: 1 SOLUTION INTRAVENOUS at 23:02

## 2022-04-25 RX ADMIN — FENTANYL CITRATE 50 MCG: 50 INJECTION, SOLUTION INTRAMUSCULAR; INTRAVENOUS at 10:35

## 2022-04-25 RX ADMIN — ROCURONIUM BROMIDE 20 MG: 50 INJECTION, SOLUTION INTRAVENOUS at 08:12

## 2022-04-25 RX ADMIN — FENTANYL CITRATE 50 MCG: 50 INJECTION, SOLUTION INTRAMUSCULAR; INTRAVENOUS at 10:42

## 2022-04-25 RX ADMIN — ROCURONIUM BROMIDE 50 MG: 50 INJECTION, SOLUTION INTRAVENOUS at 07:31

## 2022-04-25 RX ADMIN — SODIUM CHLORIDE, SODIUM LACTATE, POTASSIUM CHLORIDE, AND CALCIUM CHLORIDE 125 ML/HR: .6; .31; .03; .02 INJECTION, SOLUTION INTRAVENOUS at 12:53

## 2022-04-25 RX ADMIN — ROCURONIUM BROMIDE 10 MG: 50 INJECTION, SOLUTION INTRAVENOUS at 09:08

## 2022-04-25 RX ADMIN — Medication 1 SPRAY: at 21:46

## 2022-04-25 RX ADMIN — SODIUM CHLORIDE: 0.9 INJECTION, SOLUTION INTRAVENOUS at 09:04

## 2022-04-25 RX ADMIN — GABAPENTIN 300 MG: 300 CAPSULE ORAL at 21:46

## 2022-04-25 RX ADMIN — HYDROMORPHONE HYDROCHLORIDE 0.5 MG: 1 INJECTION, SOLUTION INTRAMUSCULAR; INTRAVENOUS; SUBCUTANEOUS at 10:47

## 2022-04-25 RX ADMIN — LIDOCAINE HYDROCHLORIDE 100 MG: 20 INJECTION, SOLUTION EPIDURAL; INFILTRATION; INTRACAUDAL; PERINEURAL at 07:31

## 2022-04-25 RX ADMIN — HYDROMORPHONE HYDROCHLORIDE 0.5 MG: 1 INJECTION, SOLUTION INTRAMUSCULAR; INTRAVENOUS; SUBCUTANEOUS at 09:36

## 2022-04-25 RX ADMIN — ACETAMINOPHEN 650 MG: 325 TABLET, FILM COATED ORAL at 19:00

## 2022-04-25 RX ADMIN — SODIUM CHLORIDE: 0.9 INJECTION, SOLUTION INTRAVENOUS at 07:40

## 2022-04-25 RX ADMIN — FENTANYL CITRATE 50 MCG: 50 INJECTION INTRAMUSCULAR; INTRAVENOUS at 08:44

## 2022-04-25 RX ADMIN — ACETAMINOPHEN 650 MG: 325 TABLET, FILM COATED ORAL at 12:53

## 2022-04-25 RX ADMIN — CEFAZOLIN SODIUM 1000 MG: 1 SOLUTION INTRAVENOUS at 15:33

## 2022-04-25 RX ADMIN — ROCURONIUM BROMIDE 10 MG: 50 INJECTION, SOLUTION INTRAVENOUS at 08:44

## 2022-04-25 RX ADMIN — SODIUM CHLORIDE: 0.9 INJECTION, SOLUTION INTRAVENOUS at 09:26

## 2022-04-25 RX ADMIN — NEOSTIGMINE METHYLSULFATE 4 MG: 1 INJECTION INTRAVENOUS at 10:12

## 2022-04-25 RX ADMIN — SENNOSIDES 8.6 MG: 8.6 TABLET ORAL at 12:53

## 2022-04-25 RX ADMIN — FENTANYL CITRATE 50 MCG: 50 INJECTION INTRAMUSCULAR; INTRAVENOUS at 08:17

## 2022-04-25 RX ADMIN — CEFAZOLIN SODIUM 2000 MG: 2 SOLUTION INTRAVENOUS at 07:21

## 2022-04-25 RX ADMIN — PROPOFOL 200 MG: 10 INJECTION, EMULSION INTRAVENOUS at 07:31

## 2022-04-25 RX ADMIN — ONDANSETRON 4 MG: 2 INJECTION INTRAMUSCULAR; INTRAVENOUS at 10:12

## 2022-04-25 RX ADMIN — HYDROMORPHONE HYDROCHLORIDE 0.5 MG: 1 INJECTION, SOLUTION INTRAMUSCULAR; INTRAVENOUS; SUBCUTANEOUS at 09:50

## 2022-04-25 RX ADMIN — MIDAZOLAM 2 MG: 1 INJECTION INTRAMUSCULAR; INTRAVENOUS at 07:25

## 2022-04-25 RX ADMIN — FENTANYL CITRATE 50 MCG: 50 INJECTION INTRAMUSCULAR; INTRAVENOUS at 10:15

## 2022-04-25 RX ADMIN — FENTANYL CITRATE 50 MCG: 50 INJECTION INTRAMUSCULAR; INTRAVENOUS at 10:22

## 2022-04-25 RX ADMIN — OXYCODONE HYDROCHLORIDE 5 MG: 5 TABLET ORAL at 21:46

## 2022-04-25 RX ADMIN — DEXAMETHASONE SODIUM PHOSPHATE 4 MG: 10 INJECTION INTRAMUSCULAR; INTRAVENOUS at 07:31

## 2022-04-25 RX ADMIN — HYDROMORPHONE HYDROCHLORIDE 0.5 MG: 1 INJECTION, SOLUTION INTRAMUSCULAR; INTRAVENOUS; SUBCUTANEOUS at 10:30

## 2022-04-25 RX ADMIN — SODIUM CHLORIDE 125 ML/HR: 0.9 INJECTION, SOLUTION INTRAVENOUS at 06:47

## 2022-04-25 RX ADMIN — TAMSULOSIN HYDROCHLORIDE 0.4 MG: 0.4 CAPSULE ORAL at 21:46

## 2022-04-25 RX ADMIN — INDOCYANINE GREEN AND WATER 25 MG: KIT at 09:07

## 2022-04-25 RX ADMIN — FENTANYL CITRATE 100 MCG: 50 INJECTION INTRAMUSCULAR; INTRAVENOUS at 07:31

## 2022-04-25 RX ADMIN — GLYCOPYRROLATE 0.6 MG: 0.2 INJECTION, SOLUTION INTRAMUSCULAR; INTRAVENOUS at 10:12

## 2022-04-25 NOTE — ANESTHESIA PREPROCEDURE EVALUATION
Procedure:  ROBOTIC PARTIAL NEPHRECTOMY (Right Abdomen)    Relevant Problems   Other   (+) Right renal mass     (+) GERD     Physical Exam    Airway    Mallampati score: II  TM Distance: >3 FB  Neck ROM: full     Dental       Cardiovascular  Rhythm: regular, Rate: normal,     Pulmonary  Breath sounds clear to auscultation,     Other Findings        Anesthesia Plan  ASA Score- 3     Anesthesia Type- general with ASA Monitors  Additional Monitors:   Airway Plan: ETT  Plan Factors-Exercise tolerance (METS): >4 METS  Chart reviewed  Existing labs reviewed  Patient is not a current smoker  Patient instructed to abstain from smoking on day of procedure  Patient did not smoke on day of surgery  Obstructive sleep apnea risk education given perioperatively  Induction- intravenous  Postoperative Plan-     Informed Consent- Anesthetic plan and risks discussed with patient  I personally reviewed this patient with the CRNA  Discussed and agreed on the Anesthesia Plan with the CRNA  Holland Meyer

## 2022-04-25 NOTE — OP NOTE
OPERATIVE REPORT  PATIENT NAME: Betty Gonzalez    :  1980  MRN: [id number]  Pt Location: [facility] OR ROOM 07    SURGERY DATE: 2022    Surgeon(s) and Role:     * Michael Walton MD - Primary     * Darell Key MD - [address] [place] [address] López Whitt PA-C - Assisting    Preop Diagnosis:  Right renal mass [N28 89]    Post-Op Diagnosis Codes:     * Right renal mass [N28 89]    Procedure(s) (LRB):  ROBOTIC PARTIAL RIGHT NEPHRECTOMY (Right)    Specimen(s):  ID Type Source Tests Collected by Time Destination   1 : RIGHT RENAL MASS Tissue Kidney, Right TISSUE EXAM Michael Walton MD 2022 [id number]        Estimated Blood Loss:   50 mL    Drains:  Closed/Suction Drain 19 Fr  (Active)   Number of days: 0       Urethral Catheter Latex 16 Fr  (Active)   Number of days: 0       Anesthesia Type:   General    Operative Indications:  Right renal mass [N28 89]      Operative Findings:  1  Single renal vein with early split  2  Renal artery takeoff inferior from the renal vein well skeletonized  3  Intraoperative ultrasound with Doppler to identify the borders of the renal mass and nearby micro vasculature  4  Intraoperative ICG to confirm warm ischemia  5  Warm ischemia time of 23 minutes  6  Estimated blood loss  cc    Complications:   None    Procedure and Technique:  Betty Gonzalez  is a 38y o  -year-old male   CT/MRI workup revealed a 2 cm right upper pole renal mass suspicious for a renal cell carcinoma  Risk and benefits of observation versus partial nephrectomy were discussed and reviewed  The patient opted to undergo [place] robot-assisted laparoscopic partial nephrectomy  Informed consent was obtained  The patient was brought to the operating room on 2022   After the smooth induction of general endotracheal anesthesia, patient was placed in a modified right lateral decubitus position  Intravenous antibiotics were administered   A timeout was performed with all members of the operative team confirming the patient's identity, procedure to be performed, and laterality of the case  Appropriate anesthetic monitoring lines were placed  A gel roll and foam wedge was placed under the patient's back with right side up  A Vogel catheter was inserted at the start of the case  The bottom leg was gently flexed  Venodyne boots had been applied  All pressure points were padded with pink pads  The top leg was placed straight  The right arm was brought across the patient's body and supported with a thoracic arm   Axillary roll was placed  The bed had been flexed and the kidney rest elevated  The patient was secured to the bed with towels padding and tape  A Veress needle was placed in the right mid abdomen  A pneumoperitoneum was created  Straight line ports were placed approximately 4 fingerbreadths lateral to the umbilicus  The 1st port was placed 1-2 fingerbreadths below the costal margin  His next 3 for ports were then placed along the same line approximately 4 fingerbreadths from 1 another  A 12 mm air seal assistant port was placed between the camera port and the right robotic arm  5mm trochar was placed sub-xiphoid with locking grasper for liver retraction  The patient was rolled to a near vertical position  The robot was docked  The hepatic flexure was mobilized  The colon was reflected medially along the white line of Toldt  The duodenum was identified and kocherized  The kidney was elevated with the 4th arm of the robot and dissection was performed in the region of the hilum  A single renal vein was identified with early split  Single artery was noted inferior to vein  These structures were skeletonized  Next I mobilized the upper pole  At this point I used the robotic ultrasound  In the expected location of the lesion based on preoperative axial imaging, I utilized the robotic ultrasound to identify the borders of the mostly endophytic renal mass   I was able to score the kidney with hot scissors in one hand while utilized ultrasound and the other to define the edges of the cyst as it was not well-visualized to the naked eye on the surface of the kidney  Doppler was utilized to identify underlying micro vasculature  Renal artery cross clamp performed with curved bulldog  ICG administered intravenously by anesthesia (10cc)  Using [employer] we confirmed ischemia  Hot and cold scissor dissection was utilized to circumscribe the tumor and remove it completely free from the underlying renal tissue  The capsule was not violated with the scissors  The specimen was immediately placed into an Endo Catch bagIn the apical lateral surface, there was an area questionable for collecting system  This was closed separately with 4-0 monocryl, simple figure of 8 suture  The remaining base of the renal parenchyma appeared normal  I then ran a 3-0 V-Lock anchored with capsular Weck clip suture through the base of the renal defect for hemostasis  A second layer closure with a zero Stratafix using a sliding clip renorrhaphy technique was then performed with excellent reapproximation of the renal cortical edges  The renal artery cross clamp was removed  Total renal artery cross-clamp time was 23 minutes  Hemostasis appeared excellent  Pneumoperitoneum was dropped to 10mmHg  FloSeal was placed over top of the closure  The pneumoperitoneum was reduced to 5 mmHg and hemostasis remained excellent  The Endo Catch bag string was brought out through the 12 mm assistant port  The robot was undocked  A #19 Erling Francisco was brought out through the right lower quadrant robotic port  The 12 mm port was opened minimally just to allow for removal of the specimen within the Endo Catch bag  The fascia in the midline was closed with a running Vicryl suture utilizing a UR 6 needle  All 8 mm robotic ports were then irrigated and the skin was closed with 4-0 Monocryl and histacryl   The patient was placed supine  Overall the patient tolerated the procedure well and were no complications  He was extubated in the operating room and transferred to the PACU in stable condition at the conclusion of the case       I was present for the entire procedure, A qualified resident physician was not available, A co-surgeon was required because of skills and techniques relevant to speciality (hilar dissection) and A physician assistant was required during the procedure for retraction tissue handling,dissection and suturing    Patient Disposition:  PACU       SIGNATURE: Kylah Love MD  DATE: April 25, 2022  TIME: 9:55 AM

## 2022-04-25 NOTE — DISCHARGE INSTRUCTIONS
After surgery, you should be active when at home  You will need to take plenty of rest  No heavy lifting (weight limit is approximately a gallon jug of milk held in each hand ) You can shower but do not submerge in water; no tubs/pools/baths  You can walk up and down stairs  Your incision has disolvable sutures and surgical glue  If there is any concern about the incision (redness, drainage, bleeding) please call your surgeon's office  You can eat and drink whatever you like, but monitor for signs of constipation  You should be having daily bowel movements  Take your stool softeners until your bowel begin to function  If you are still constipated, call your surgeon's office to discuss additional medication  Laparoscopic Partial Nephrectomy   WHAT YOU NEED TO KNOW:   Laparoscopic partial nephrectomy is surgery to remove part of your kidney  Surgery will be done through small incisions on your side  DISCHARGE INSTRUCTIONS:   Call your local emergency number (911 in the [address] [place] [address],[address]) if:   · You have sudden chest pain or trouble breathing  Seek care immediately if:   · Blood soaks through your bandage  · Your stitches come apart  · You cannot urinate  Call your nephrologist or surgeon if:   · You have a fever  · You have blood in your urine  · Your wound is red, swollen, or draining pus  · You have chills, a cough, or feel weak and achy  · You have questions or concerns about your condition or care  Medicines: You may need any of the following:  · Prescription pain medicine  may be given  Ask your healthcare provider how to take this medicine safely  Some prescription pain medicines contain acetaminophen  Do not take other medicines that contain acetaminophen without talking to your healthcare provider  Too much acetaminophen may cause liver damage  Prescription pain medicine may cause constipation  Ask your healthcare provider how to prevent or treat constipation       · Antibiotics may be given to prevent or treat an infection caused by bacteria  · Bowel movement softeners  may be given to help prevent constipation  · Take your medicine as directed  Contact your healthcare provider if you think your medicine is not helping or if you have side effects  Tell him or her if you are allergic to any medicine  Keep a list of the medicines, vitamins, and herbs you take  Include the amounts, and when and why you take them  Bring the list or the pill bottles to follow-up visits  Carry your medicine list with you in case of an emergency  Care for the surgery area:   · Do not get your stitches wet unless your surgeon says it is okay  When you are allowed to bathe, carefully wash the area with soap and water  Gently pat the area dry and put on new, clean bandages as directed  Change your bandages when they get wet or dirty  · You may have pieces of medical tape on your incision wound  Keep them clean and dry  They will fall off on their own after several days  Do not pull them off  Drink liquids as directed: This will help prevent constipation  Ask your healthcare provider how much liquid to drink each day and which liquids are best for you  Activity:  Do not lift, pull, or push heavy objects  You may also need to limit movement, such as bending your back or twisting  Ask when you can return to work and do other activities  Follow up with your nephrologist or surgeon as directed: You will need to return to have your wound checked and stitches removed  Write down your questions so you remember to ask them during your visits  © Copyright [employer] 2022 Information is for End User's use only and may not be sold, redistributed or otherwise used for commercial purposes  All illustrations and images included in CareNotes® are the copyrighted property of [employer]  or Jose Hernandez  The above information is an  only   It is not intended as medical advice for individual conditions or treatments  Talk to your doctor, nurse or pharmacist before following any medical regimen to see if it is safe and effective for you

## 2022-04-25 NOTE — INTERVAL H&P NOTE
H&P reviewed  After examining the patient I find no changes in the patients condition since the H&P had been written  Appreciate this outside medical clearance with full H&P from 3/28/22  Discussed again plan for surgery using certified [address]   Proceed with RIGHT robotic assisted laparoscopic partial nephrectomy, possible open, possible radical  Risks and benefits discussed and reviewed  RIGHT side marked  Informed consent previously signed      Vitals:    04/25/22 0612   BP: 112/82   Pulse: (!) 108   Resp: 14   Temp: 98 2 °F (36 8 °C)   SpO2: 98%

## 2022-04-25 NOTE — PLAN OF CARE
Problem: PAIN - ADULT  Goal: Verbalizes/displays adequate comfort level or baseline comfort level  Description: Interventions:  - Encourage patient to monitor pain and request assistance  - Assess pain using appropriate pain scale  - Administer analgesics based on type and severity of pain and evaluate response  - Implement non-pharmacological measures as appropriate and evaluate response  - Consider cultural and social influences on pain and pain management  - Notify physician/advanced practitioner if interventions unsuccessful or patient reports new pain  Outcome: Progressing     Problem: METABOLIC, FLUID AND ELECTROLYTES - ADULT  Goal: Electrolytes maintained within normal limits  Description: INTERVENTIONS:  - Monitor labs and assess patient for signs and symptoms of electrolyte imbalances  - Administer electrolyte replacement as ordered  - Monitor response to electrolyte replacements, including repeat lab results as appropriate  - Instruct patient on fluid and nutrition as appropriate  Outcome: Progressing  Goal: Fluid balance maintained  Description: INTERVENTIONS:  - Monitor labs   - Monitor I/O and WT  - Instruct patient on fluid and nutrition as appropriate  - Assess for signs & symptoms of volume excess or deficit  Outcome: Progressing  Goal: Glucose maintained within target range  Description: INTERVENTIONS:  - Monitor Blood Glucose as ordered  - Assess for signs and symptoms of hyperglycemia and hypoglycemia  - Administer ordered medications to maintain glucose within target range  - Assess nutritional intake and initiate nutrition service referral as needed  Outcome: Progressing     Problem: HEMATOLOGIC - ADULT  Goal: Maintains hematologic stability  Description: INTERVENTIONS  - Assess for signs and symptoms of bleeding or hemorrhage  - Monitor labs  - Administer supportive blood products/factors as ordered and appropriate  Outcome: Progressing     Problem: SKIN/TISSUE INTEGRITY - ADULT  Goal: Incision(s), wounds(s) or drain site(s) healing without S/S of infection  Description: INTERVENTIONS  - Assess and document dressing, incision, wound bed, drain sites and surrounding tissue  - Provide patient and family education  - Perform skin care/dressing changes every Q12 H  Problem: RESPIRATORY - ADULT  Goal: Achieves optimal ventilation and oxygenation  Description: INTERVENTIONS:  - Assess for changes in respiratory status  - Assess for changes in mentation and behavior  - Position to facilitate oxygenation and minimize respiratory effort  - Oxygen administered by appropriate delivery if ordered  - Initiate smoking cessation education as indicated  - Encourage broncho-pulmonary hygiene including cough, deep breathe, Incentive Spirometry  - Assess the need for suctioning and aspirate as needed  - Assess and instruct to report SOB or any respiratory difficulty  - Respiratory Therapy support as indicated  Outcome: Progressing     Outcome: Progressing     Problem: GENITOURINARY - ADULT  Goal: Urinary catheter remains patent  Description: INTERVENTIONS:  - Assess patency of urinary catheter  - If patient has a chronic adler, consider changing catheter if non-functioning  - Follow guidelines for intermittent irrigation of non-functioning urinary catheter  Outcome: Progressing

## 2022-04-25 NOTE — QUICK NOTE
Reviewed intraoperative findings with patient in PACU via [address]   Brother contacted by phone with update as well

## 2022-04-25 NOTE — ANESTHESIA POSTPROCEDURE EVALUATION
Post-Op Assessment Note    CV Status:  Stable    Pain management: adequate     Mental Status:  Alert and awake   Hydration Status:  Euvolemic   PONV Controlled:  Controlled   Airway Patency:  Patent      Post Op Vitals Reviewed: Yes      Staff: Anesthesiologist         No complications documented      /83 (04/25/22 1135)    Temp 98 3 °F (36 8 °C) (04/25/22 1135)    Pulse 91 (04/25/22 1135)   Resp 14 (04/25/22 1135)    SpO2 94 % (04/25/22 1135)

## 2022-04-26 ENCOUNTER — TELEPHONE (OUTPATIENT)
Dept: OTHER | Facility: HOSPITAL | Age: 42
End: 2022-04-26

## 2022-04-26 VITALS
WEIGHT: 198.19 LBS | HEIGHT: 74 IN | DIASTOLIC BLOOD PRESSURE: 82 MMHG | OXYGEN SATURATION: 98 % | RESPIRATION RATE: 20 BRPM | SYSTOLIC BLOOD PRESSURE: 122 MMHG | HEART RATE: 109 BPM | TEMPERATURE: 98.8 F | BODY MASS INDEX: 25.44 KG/M2

## 2022-04-26 LAB
ANION GAP SERPL CALCULATED.3IONS-SCNC: 4 MMOL/L (ref 4–13)
BUN SERPL-MCNC: 8 MG/DL (ref 5–25)
CALCIUM SERPL-MCNC: 8.3 MG/DL (ref 8.3–10.1)
CHLORIDE SERPL-SCNC: 104 MMOL/L (ref 100–108)
CO2 SERPL-SCNC: 30 MMOL/L (ref 21–32)
CREAT SERPL-MCNC: 1.03 MG/DL (ref 0.6–1.3)
ERYTHROCYTE [DISTWIDTH] IN BLOOD BY AUTOMATED COUNT: 12 % (ref 11.6–15.1)
GFR SERPL CREATININE-BSD FRML MDRD: 87 ML/MIN/1.73SQ M
GLUCOSE SERPL-MCNC: 108 MG/DL (ref 65–140)
HCT VFR BLD AUTO: 37.3 % (ref 36.5–49.3)
HGB BLD-MCNC: 12.4 G/DL (ref 12–17)
MCH RBC QN AUTO: 29.8 PG (ref 26.8–34.3)
MCHC RBC AUTO-ENTMCNC: 33.2 G/DL (ref 31.4–37.4)
MCV RBC AUTO: 90 FL (ref 82–98)
PLATELET # BLD AUTO: 184 THOUSANDS/UL (ref 149–390)
PMV BLD AUTO: 10.2 FL (ref 8.9–12.7)
POTASSIUM SERPL-SCNC: 4 MMOL/L (ref 3.5–5.3)
RBC # BLD AUTO: 4.16 MILLION/UL (ref 3.88–5.62)
SODIUM SERPL-SCNC: 138 MMOL/L (ref 136–145)
WBC # BLD AUTO: 7.97 THOUSAND/UL (ref 4.31–10.16)

## 2022-04-26 PROCEDURE — 99024 POSTOP FOLLOW-UP VISIT: CPT | Performed by: PHYSICIAN ASSISTANT

## 2022-04-26 PROCEDURE — 85027 COMPLETE CBC AUTOMATED: CPT | Performed by: UROLOGY

## 2022-04-26 PROCEDURE — NC001 PR NO CHARGE: Performed by: PHYSICIAN ASSISTANT

## 2022-04-26 PROCEDURE — 80048 BASIC METABOLIC PNL TOTAL CA: CPT | Performed by: UROLOGY

## 2022-04-26 RX ORDER — DOCUSATE SODIUM 100 MG/1
100 CAPSULE, LIQUID FILLED ORAL 2 TIMES DAILY
Qty: 20 CAPSULE | Refills: 0 | Status: SHIPPED | OUTPATIENT
Start: 2022-04-26

## 2022-04-26 RX ADMIN — ACETAMINOPHEN 650 MG: 325 TABLET, FILM COATED ORAL at 14:25

## 2022-04-26 RX ADMIN — SENNOSIDES 8.6 MG: 8.6 TABLET ORAL at 09:58

## 2022-04-26 RX ADMIN — HEPARIN SODIUM 5000 UNITS: 5000 INJECTION INTRAVENOUS; SUBCUTANEOUS at 07:00

## 2022-04-26 RX ADMIN — ACETAMINOPHEN 650 MG: 325 TABLET, FILM COATED ORAL at 07:00

## 2022-04-26 NOTE — UTILIZATION REVIEW
Initial Clinical Review    Elective    IP    surgical procedure    Age/Sex: 38 y o  male     Surgery Date:    4/25/22    Procedure: ROBOTIC PARTIAL RIGHT NEPHRECTOMY (Right)    Anesthesia:    general    Operative Findings:  Single renal vein with early split  2  Renal artery takeoff inferior from the renal vein well skeletonized  3  Intraoperative ultrasound with Doppler to identify the borders of the renal mass and nearby micro vasculature  4  Intraoperative ICG to confirm warm ischemia  5  Warm ischemia time of 23 minutes  6  Estimated blood loss  cc       POD#1 Progress Note:   4/26   Continue  Post op care  Vogel  Catheter  D/c  This am   MATTHEW drain intact, monitor output, likely  D/c  Encourage ambulation/incentive spirometry  Monitor labs  Continue pain control as needed      Admission Orders: Date/Time/Statement:   Admission Orders (From admission, onward)     Ordered        04/25/22 1004  Inpatient Admission  Once                      Orders Placed This Encounter   Procedures    Inpatient Admission     Standing Status:   Standing     Number of Occurrences:   1     Order Specific Question:   Level of Care     Answer:   Med Surg [16]     Order Specific Question:   Estimated length of stay     Answer:   Inpatient Only Surgery     Vital Signs: /82 (BP Location: Left arm)   Pulse (!) 109   Temp 98 8 °F (37 1 °C) (Temporal)   Resp 20   Ht 6' 2" (1 88 m)   Wt 89 9 kg (198 lb 3 1 oz)   SpO2 98%   BMI 25 45 kg/m²     Pertinent Labs/Diagnostic Test Results:     Results from last 7 days   Lab Units 04/21/22  1453   SARS-COV-2  Negative     Results from last 7 days   Lab Units 04/26/22  0429   WBC Thousand/uL 7 97   HEMOGLOBIN g/dL 12 4   HEMATOCRIT % 37 3   PLATELETS Thousands/uL 184         Results from last 7 days   Lab Units 04/26/22  0429   SODIUM mmol/L 138   POTASSIUM mmol/L 4 0   CHLORIDE mmol/L 104   CO2 mmol/L 30   ANION GAP mmol/L 4   BUN mg/dL 8   CREATININE mg/dL 1 03   EGFR ml/min/1 73sq m 87   CALCIUM mg/dL 8 3             Results from last 7 days   Lab Units 04/26/22  0429   GLUCOSE RANDOM mg/dL 108                 Diet:    Surgical soft    Mobility:    OOB as  tolerated    DVT Prophylaxis:    SCD'S    Medications/Pain Control:   Scheduled Medications:  acetaminophen, 650 mg, Oral, Q6H FRED  gabapentin, 300 mg, Oral, HS  heparin (porcine), 5,000 Units, Subcutaneous, Q8H FRED  senna, 1 tablet, Oral, Daily  tamsulosin, 0 4 mg, Oral, HS      Continuous IV Infusions:  lactated ringers, 75 mL/hr, Intravenous, Continuous      PRN Meds:  HYDROmorphone, 1 mg, Intravenous, Q3H PRN  lactated ringers, 1,000 mL, Intravenous, Once PRN   And  lactated ringers, 1,000 mL, Intravenous, Once PRN  ondansetron, 4 mg, Intravenous, Q6H PRN  oxyCODONE, 10 mg, Oral, Q4H PRN  oxyCODONE, 5 mg, Oral, Q4H PRN  phenol, 1 spray, Mouth/Throat, Q2H PRN  simethicone, 80 mg, Oral, 4x Daily PRN  sodium chloride, 1,000 mL, Intravenous, Once PRN   And  sodium chloride, 1,000 mL, Intravenous, Once PRN  trimethobenzamide, 200 mg, Intramuscular, Once PRN        Network Utilization Review Department  ATTENTION: Please call with any questions or concerns to [phone] and carefully listen to the prompts so that you are directed to the right person  All voicemails are confidential   Sofy Tam all requests for admission clinical reviews, approved or denied determinations and any other requests to dedicated fax number below belonging to the campus where the patient is receiving treatment   List of dedicated fax numbers for the Facilities:  [address] DENIALS (Administrative/Medical Necessity) [phone]   [address] (Maternity/NICU/Pediatrics) [phone]   [address] [place] [address], [facility] [address] [address] [place] [address] [address] [facility] [place] [address] [phone]

## 2022-04-26 NOTE — ASSESSMENT & PLAN NOTE
· POD#1 right partial nephrectomy  · Surgical incision sites clean dry and intact  Abdomen soft  · Vogel catheter was patent draining clear yellow urine this morning  · Vogel catheter removed by nursing staff this morning  · MATTHEW drain patent with serosanguineous drainage  30 cc output overnight  150 cc output total following procedure  · Will monitor MATTHEW output following Vogel catheter removal   If output remains low, will remove at bedside prior to discharge  · Patient is afebrile without leukocytosis  Creatinine stable 1 03  Hemoglobin stable at 12 4   · Encourage ambulation and incentive spirometry  · Diet advanced to soft surgical diet  Fluids decreased to 75 ml/hr  · Tentative plan for discharge later today

## 2022-04-26 NOTE — DISCHARGE SUMMARY
Discharge Summary - Kimmy Verdin 38 y o  male MRN: [id number]    Unit/Bed#: E2 -36 Encounter: [id number]    Admission Date: 4/25/2022     Discharge Date: 04/26/22    HPI: Kimmy Verdin is a 38 y o  male who presented for partial right nephrectomy by Dr Farrah Christensen  Procedure(s):  ROBOTIC LAPAROSCOPIC PARTIAL RIGHT NEPHRECTOMY  Surgeon(s):  MD Christos Martinez MD Manus Melena, [place]  4/25/2022    Hospital Course:  Patient presented to [facility] on 04/25/2022 for scheduled procedure listed above  He was taken to the OR where Dr Philomena Spence performed the procedure without complication  At the conclusion of the case, he was extubated in the OR and transferred to the PACU in stable condition  He was later transferred to the medical-surgical unit where he continued his recovery overnight without complication  This morning his Vogel catheter was patent draining clear yellow urine  This was removed at the bedside  His MATTHEW was patent by 0 status drainage  Total output since procedure was 150 cc with 30 cc overnight  Following Vogel catheter removal, only additional 20 cc output  Patient has been tolerating oral diet, ambulating well, and passing gas  He only reports mild chandu-incisional tenderness  He has not yet voided following Vogel catheter removal but we will continue to monitor prior to sending him home  Discharge Diagnosis: Right renal mass    Condition at Discharge: good     Discharge Medications:  See after visit summary for reconciled discharge medications provided to patient and family  Patient was discharged home on home medications with the addition of Colace  Discharge instructions/Information to patient and family:   See after visit summary for information provided to patient and family  Provisions for Follow-Up Care:  See after visit summary for information related to follow-up care and any pertinent home health orders  Disposition: Home    Planned Readmission: No    Discharge Statement   I spent 15 minutes discharging the patient  This time was spent on the day of discharge  I had direct contact with the patient on the day of discharge  Additional documentation is required if more than 30 minutes were spent on discharge       Signature:   Cori Bonilla  Date: 4/26/2022 Time: 12:39 PM

## 2022-04-26 NOTE — PLAN OF CARE
Problem: MOBILITY - ADULT  Goal: Maintain or return to baseline ADL function  Description: INTERVENTIONS:  -  Assess patient's ability to carry out ADLs; assess patient's baseline for ADL function and identify physical deficits which impact ability to perform ADLs (bathing, care of mouth/teeth, toileting, grooming, dressing, etc )  - Assess/evaluate cause of self-care deficits   - Assess range of motion  - Assess patient's mobility; develop plan if impaired  - Assess patient's need for assistive devices and provide as appropriate  - Encourage maximum independence but intervene and supervise when necessary  - Involve family in performance of ADLs  - Assess for home care needs following discharge   - Consider OT consult to assist with ADL evaluation and planning for discharge  - Provide patient education as appropriate  Outcome: Progressing  Goal: Maintains/Returns to pre admission functional level  Description: INTERVENTIONS:  - Perform BMAT or MOVE assessment daily    - Set and communicate daily mobility goal to care team and patient/family/caregiver  - Collaborate with rehabilitation services on mobility goals if consulted  - Perform Range of Motion 3 times a day  - Reposition patient every 2 hours    - Dangle patient 3 times a day  - Stand patient 3 times a day  - Ambulate patient 3 times a day  - Out of bed to chair 3 times a day   - Out of bed for meals 3 times a day  - Out of bed for toileting  - Record patient progress and toleration of activity level   Outcome: Progressing     Problem: PAIN - ADULT  Goal: Verbalizes/displays adequate comfort level or baseline comfort level  Description: Interventions:  - Encourage patient to monitor pain and request assistance  - Assess pain using appropriate pain scale  - Administer analgesics based on type and severity of pain and evaluate response  - Implement non-pharmacological measures as appropriate and evaluate response  - Consider cultural and social influences on pain and pain management  - Notify physician/advanced practitioner if interventions unsuccessful or patient reports new pain  Outcome: Progressing     Problem: DISCHARGE PLANNING  Goal: Discharge to home or other facility with appropriate resources  Description: INTERVENTIONS:  - Identify barriers to discharge w/patient and caregiver  - Arrange for needed discharge resources and transportation as appropriate  - Identify discharge learning needs (meds, wound care, etc )  - Arrange for interpretive services to assist at discharge as needed  - Refer to Case Management Department for coordinating discharge planning if the patient needs post-hospital services based on physician/advanced practitioner order or complex needs related to functional status, cognitive ability, or social support system  Outcome: Progressing     Problem: Knowledge Deficit  Goal: Patient/family/caregiver demonstrates understanding of disease process, treatment plan, medications, and discharge instructions  Description: Complete learning assessment and assess knowledge base    Interventions:  - Provide teaching at level of understanding  - Provide teaching via preferred learning methods  Outcome: Progressing     Problem: RESPIRATORY - ADULT  Goal: Achieves optimal ventilation and oxygenation  Description: INTERVENTIONS:  - Assess for changes in respiratory status  - Assess for changes in mentation and behavior  - Position to facilitate oxygenation and minimize respiratory effort  - Oxygen administered by appropriate delivery if ordered  - Initiate smoking cessation education as indicated  - Encourage broncho-pulmonary hygiene including cough, deep breathe, Incentive Spirometry  - Assess the need for suctioning and aspirate as needed  - Assess and instruct to report SOB or any respiratory difficulty  - Respiratory Therapy support as indicated  Outcome: Progressing     Problem: GENITOURINARY - ADULT  Goal: Urinary catheter remains patent  Description: INTERVENTIONS:  - Assess patency of urinary catheter  - If patient has a chronic adler, consider changing catheter if non-functioning  - Follow guidelines for intermittent irrigation of non-functioning urinary catheter  Outcome: Progressing     Problem: METABOLIC, FLUID AND ELECTROLYTES - ADULT  Goal: Electrolytes maintained within normal limits  Description: INTERVENTIONS:  - Monitor labs and assess patient for signs and symptoms of electrolyte imbalances  - Administer electrolyte replacement as ordered  - Monitor response to electrolyte replacements, including repeat lab results as appropriate  - Instruct patient on fluid and nutrition as appropriate  Outcome: Progressing  Goal: Fluid balance maintained  Description: INTERVENTIONS:  - Monitor labs   - Monitor I/O and WT  - Instruct patient on fluid and nutrition as appropriate  - Assess for signs & symptoms of volume excess or deficit  Outcome: Progressing  Goal: Glucose maintained within target range  Description: INTERVENTIONS:  - Monitor Blood Glucose as ordered  - Assess for signs and symptoms of hyperglycemia and hypoglycemia  - Administer ordered medications to maintain glucose within target range  - Assess nutritional intake and initiate nutrition service referral as needed  Outcome: Progressing     Problem: SKIN/TISSUE INTEGRITY - ADULT  Goal: Incision(s), wounds(s) or drain site(s) healing without S/S of infection  Description: INTERVENTIONS  - Assess and document dressing, incision, wound bed, drain sites and surrounding tissue  - Provide patient and family education  - Perform skin care/dressing changes every shift  Outcome: Progressing     Problem: HEMATOLOGIC - ADULT  Goal: Maintains hematologic stability  Description: INTERVENTIONS  - Assess for signs and symptoms of bleeding or hemorrhage  - Monitor labs  - Administer supportive blood products/factors as ordered and appropriate  Outcome: Progressing

## 2022-04-26 NOTE — UTILIZATION REVIEW
Inpatient Admission Authorization Request   NOTIFICATION OF INPATIENT ADMISSION/INPATIENT AUTHORIZATION REQUEST   SERVICING FACILITY:   [address] [place] [address], [address] [facility]  Tax ID: [id number]  NPI: [id number]  Place of Service: [facility]patient [facility]  [address]  Place of Service Code: 24     ATTENDING PROVIDER:  Attending Name and NPI#: Julianne Jacques Md [[id number]]  Address: [address] [place] [address], [address] [facility]  Phone: [phone]     UTILIZATION REVIEW CONTACT:  Sarah Cornejo, Utilization   Network Utilization Review Department  Phone: [phone]  Fax: [phone]  Email: [email or web address]     PHYSICIAN ADVISORY SERVICES:  FOR [id number] REVIEW - MEDICAL NECESSITY DENIAL  Phone: [phone]  Fax: [phone]  Email: [email or web address]     TYPE OF REQUEST:  Inpatient Status     ADMISSION INFORMATION:  ADMISSION DATE/TIME: 4/25/22 10:04 AM  PATIENT DIAGNOSIS CODE/DESCRIPTION:  Right renal mass [N28 [id number]]  DISCHARGE DATE/TIME: No discharge date for patient encounter  IMPORTANT INFORMATION:  Please contact the Sarah Cornejo directly with any questions or concerns regarding this request  Department voicemails are confidential     Send requests for admission clinical reviews, concurrent reviews, approvals, and administrative denials due to lack of clinical to fax [phone]

## 2022-04-26 NOTE — PROGRESS NOTES
Progress Note - Urology  Ricardo Strong 1980, 38 y o  male MRN: [id number]    Unit/Bed#: E2 -01 Encounter: [id number]    * Right renal mass  Assessment & Plan  · POD#1 right partial nephrectomy  · Surgical incision sites clean dry and intact  Abdomen soft  · Adler catheter was patent draining clear yellow urine this morning  · Adler catheter removed by nursing staff this morning  · MATTHEW drain patent with serosanguineous drainage  30 cc output overnight  150 cc output total following procedure  · Will monitor MATTHEW output following Adler catheter removal   If output remains low, will remove at bedside prior to discharge  · Patient is afebrile without leukocytosis  Creatinine stable 1 03  Hemoglobin stable at 12 4   · Encourage ambulation and incentive spirometry  · Diet advanced to soft surgical diet  Fluids decreased to 75 ml/hr  · Tentative plan for discharge later today  Subjective:   HPI:  utilized via [employer] this morning  Patient denied any pain  He has been tolerating oral diet and ambulating well  He denies any flatus or BM  His adler catheter was patent, draining clear yellow urine this morning  MATTHEW was patent with serosanguineous drainage  Review of Systems   Constitutional: Negative for chills and fever  HENT: Negative for congestion and sore throat  Respiratory: Negative for cough and shortness of breath  Cardiovascular: Negative for chest pain and palpitations  Gastrointestinal: Negative for abdominal pain, nausea and vomiting  Genitourinary: Negative for decreased urine volume, flank pain and hematuria  Musculoskeletal: Negative  Skin:        Denies irritation at surgical incision sites  Neurological: Negative for dizziness and light-headedness  Objective:  Nursing Rounds: Plan discussed with Hermelinda Portillo RN  Vitals: Blood pressure 122/82, pulse (!) 109, temperature 98 8 °F (37 1 °C), temperature source Temporal, resp   rate 20, height 6' 2" (1 88 m), weight 89 9 kg (198 lb 3 1 oz), SpO2 98 %  ,Body mass index is 25 45 kg/m²  Physical Exam  Vitals reviewed  Constitutional:       General: He is not in acute distress  Appearance: Normal appearance  He is not ill-appearing, toxic-appearing or diaphoretic  HENT:      Head: Normocephalic and atraumatic  Eyes:      Conjunctiva/sclera: Conjunctivae normal    Cardiovascular:      Rate and Rhythm: Normal rate and regular rhythm  Heart sounds: Normal heart sounds  Pulmonary:      Effort: Pulmonary effort is normal  No respiratory distress  Breath sounds: Normal breath sounds  Abdominal:      General: Bowel sounds are normal  There is no distension  Palpations: Abdomen is soft  Tenderness: There is no abdominal tenderness  There is no right CVA tenderness, left CVA tenderness, guarding or rebound  Comments: Surgical incision sites clean dry and intact with appropriate chandu-incisional tenderness  MATTHEW drain patent with serosanguineous drainage  Genitourinary:     Comments: Vogel catheter was patent draining clear yellow urine  Musculoskeletal:         General: Normal range of motion  Cervical back: Normal range of motion  Skin:     General: Skin is warm and dry  Neurological:      General: No focal deficit present  Mental Status: He is alert and oriented to person, place, and time  Psychiatric:         Mood and Affect: Mood normal          Behavior: Behavior normal          Thought Content: Thought content normal          Judgment: Judgment normal        Imaging:  No new imaging       Labs:  Recent Labs     04/26/22 0429   WBC 7 97       Recent Labs     04/26/22  0429   HGB 12 4     Recent Labs     04/26/22 0429   HCT 37 3     Recent Labs     04/26/22 0429   CREATININE 1 03       History:    Past Medical History:   Diagnosis Date    Cancer [facility])     Hearing impaired person, bilateral     uses sign language  needs      Social History Socioeconomic History    Marital status: Single     Spouse name: None    Number of children: None    Years of education: None    Highest education level: None   Occupational History    None   Tobacco Use    Smoking status: Never Smoker    Smokeless tobacco: Never Used   Vaping Use    Vaping Use: Never used   Substance and Sexual Activity    Alcohol use: Never    Drug use: Never    Sexual activity: Not Currently   Other Topics Concern    None   Social History Narrative    None     Social Determinants of Health     Financial Resource Strain: Not on file   Food Insecurity: Not on file   Transportation Needs: Not on file   Physical Activity: Not on file   Stress: Not on file   Social Connections: Not on file   Intimate Partner Violence: Not on file   Housing Stability: Not on file     Past Surgical History:   Procedure Laterality Date    BACK SURGERY      [place] LAP,PARTIAL NEPHRECTOMY Right 4/25/2022    Procedure: ROBOTIC LAPAROSCOPIC PARTIAL RIGHT NEPHRECTOMY;  Surgeon: Urmila Teixeira MD;  Location: [facility] OR;  Service: Urology     Family History   Problem Relation Age of Onset    Diabetes Mother     Diabetes Father        Cori Gaston  Date: 4/26/2022 Time: 10:09 AM

## 2022-04-26 NOTE — PLAN OF CARE
Problem: MOBILITY - ADULT  Goal: Maintain or return to baseline ADL function  Description: INTERVENTIONS:  -  Assess patient's ability to carry out ADLs; assess patient's baseline for ADL function and identify physical deficits which impact ability to perform ADLs (bathing, care of mouth/teeth, toileting, grooming, dressing, etc )  - Assess/evaluate cause of self-care deficits   - Assess range of motion  - Assess patient's mobility; develop plan if impaired  - Assess patient's need for assistive devices and provide as appropriate  - Encourage maximum independence but intervene and supervise when necessary  - Involve family in performance of ADLs  - Assess for home care needs following discharge   - Consider OT consult to assist with ADL evaluation and planning for discharge  - Provide patient education as appropriate  Outcome: Adequate for Discharge  Goal: Maintains/Returns to pre admission functional level  Description: INTERVENTIONS:  - Perform BMAT or MOVE assessment daily    - Set and communicate daily mobility goal to care team and patient/family/caregiver  - Collaborate with rehabilitan services on mobility goals if consulted  - Perform Range of Motion  times a day  - Reposition patient everyhours    - Dangle patient times a day  - Stand patient times a day  - Ambulate patient times a day  - Out of bed to chair  times a day   - Out of bed for meals times a day  - Out of bed for toileting  - Record patient progress and toleration of activity level   Outcome: Adequate for Discharge

## 2022-04-26 NOTE — TELEPHONE ENCOUNTER
POD#1 partial right nephrectomy with ZP  Being DCed home today  I didn't see any f/u scheduled for him  Wanted to reach out to the office to make you aware  Thanks!

## 2022-04-27 NOTE — TELEPHONE ENCOUNTER
Appears patient called back because he has an appointment on 5/27/2022 with Dr Levis Meigs  Called patient and using [address]  patient rescheduled to May 12 2022 at 245 at the [facility] office  Called [facility] and spoke with Moraima to arrange interpretor for Parker's visit on May 12th

## 2022-04-27 NOTE — TELEPHONE ENCOUNTER
Called patient using [address]  and left voicemail asking patient to please call the office to schedule post op appointment  Dr Enid Vera has openings on May 12 2022

## 2022-04-29 LAB
ABO GROUP BLD BPU: NORMAL
ABO GROUP BLD BPU: NORMAL
BPU ID: NORMAL
BPU ID: NORMAL
CROSSMATCH: NORMAL
CROSSMATCH: NORMAL
UNIT DISPENSE STATUS: NORMAL
UNIT DISPENSE STATUS: NORMAL
UNIT PRODUCT CODE: NORMAL
UNIT PRODUCT CODE: NORMAL
UNIT PRODUCT VOLUME: 300 ML
UNIT PRODUCT VOLUME: 350 ML
UNIT RH: NORMAL
UNIT RH: NORMAL

## 2022-05-03 NOTE — TELEPHONE ENCOUNTER
Patient's FMLA paperwork Aleyda Newberry) completed and office notes fax to [phone]  Confirmation received  Paperwork scanned into chart  Copy mailed to patient

## 2022-05-12 ENCOUNTER — OFFICE VISIT (OUTPATIENT)
Dept: UROLOGY | Facility: CLINIC | Age: 42
End: 2022-05-12

## 2022-05-12 VITALS
BODY MASS INDEX: 25.67 KG/M2 | WEIGHT: 200 LBS | SYSTOLIC BLOOD PRESSURE: 122 MMHG | HEIGHT: 74 IN | DIASTOLIC BLOOD PRESSURE: 80 MMHG

## 2022-05-12 DIAGNOSIS — C64.1 RENAL CELL CARCINOMA OF RIGHT KIDNEY (HCC): Primary | ICD-10-CM

## 2022-05-12 PROCEDURE — 99024 POSTOP FOLLOW-UP VISIT: CPT | Performed by: UROLOGY

## 2022-05-12 NOTE — PROGRESS NOTES
UROLOGY POSTOP NOTE     CHIEF COMPLAINT   Momo Veras is a 38 y o  male with a complaint of   No chief complaint on file  History of Present Illness:     38 y o  male with hearing impairment, accompanied by ASL   Patient intially presented with testicular pain  Patient had a scrotal and renal ultrasound as part of his index workup  Renal ultrasound demonstrated concern for a potential angiomyolipoma of the right kidney  MRI was performed in follow-up  Unfortunately this study is significantly limited due to respiratory motion  He presents today to discuss the results  He is no longer having significant discomfort or difficulty with his testicles and groin  Patient returns for updated imaging review  As there was motion during the last MRI, the patient underwent CT scan  There is no signs of fat and so now the concern is that this may not be an angiomyolipoma  Treatment planning discussion today  Patient underwent robotic partial nephrectomy on April 25th  Surgery was uncomplicated    Returns in follow-up    Past Medical History:     Past Medical History:   Diagnosis Date    Cancer [facility])     Hearing impaired person, bilateral     uses sign language  needs        PAST SURGICAL HISTORY:     Past Surgical History:   Procedure Laterality Date    BACK SURGERY      TX LAP,PARTIAL NEPHRECTOMY Right 4/25/2022    Procedure: ROBOTIC LAPAROSCOPIC PARTIAL RIGHT NEPHRECTOMY;  Surgeon: Missy Guardado MD;  Location: [place];  Service: Urology       CURRENT MEDICATIONS:     Current Outpatient Medications   Medication Sig Dispense Refill    Ascorbic Acid (vitamin C) 100 MG tablet Take 100 mg by mouth daily      docusate sodium (COLACE) 100 mg capsule Take 1 capsule (100 mg total) by mouth 2 (two) times a day 20 capsule 0    multivitamin (THERAGRAN) TABS Take 1 tablet by mouth daily      vitamin E, tocopherol, 1,000 units capsule Take 1,000 Units by mouth daily No current facility-administered medications for this visit  ALLERGIES:   No Known Allergies    SOCIAL HISTORY:     Social History     Socioeconomic History    Marital status: Single     Spouse name: Not on file    Number of children: Not on file    Years of education: Not on file    Highest education level: Not on file   Occupational History    Not on file   Tobacco Use    Smoking status: Never Smoker    Smokeless tobacco: Never Used   Vaping Use    Vaping Use: Never used   Substance and Sexual Activity    Alcohol use: Never    Drug use: Never    Sexual activity: Not Currently   Other Topics Concern    Not on file   Social History Narrative    Not on file     Social Determinants of Health     Financial Resource Strain: Not on file   Food Insecurity: Not on file   Transportation Needs: Not on file   Physical Activity: Not on file   Stress: Not on file   Social Connections: Not on file   Intimate Partner Violence: Not on file   Housing Stability: Not on file       SOCIAL HISTORY:     Family History   Problem Relation Age of Onset    Diabetes Mother     Diabetes Father        REVIEW OF SYSTEMS:     Review of Systems   Constitutional: Negative  HENT: Positive for hearing loss  Respiratory: Negative  Cardiovascular: Negative  Gastrointestinal: Negative  Genitourinary: Negative  Musculoskeletal: Negative  Skin: Negative  Psychiatric/Behavioral: Negative  PHYSICAL EXAM:     There were no vitals taken for this visit  General:  Healthy appearing male in no acute distress  They have a normal affect  Hearing impaired  HEENT:  Normocephalic, atraumatic  Neck is supple without any palpable lymphadenopathy  Cardiovascular:  Patient has normal palpable distal radial pulses  There is no significant peripheral edema  No JVD is noted  Respiratory:  Patient has unlabored respirations  There is no audible wheeze or rhonchi  Abdomen:   Healing robotic incisions  Abdomen is soft and nontender  There is no tympany  Inguinal and umbilical hernia are not appreciated  Musculoskeletal:  Patient does not have significant CVA tenderness in the  flank with palpation or percussion  They full range of motion in all 4 extremities  Strength in all 4 extremities appears congruent  Patient is able to ambulate without assistance or difficulty  Dermatologic:  Patient has no skin abnormalities or rashes  LABS:     CBC:   Lab Results   Component Value Date    WBC 7 97 2022    HGB 12 4 2022    HCT 37 3 2022    MCV 90 2022     2022       BMP:   Lab Results   Component Value Date    CALCIUM 8 3 2022    K 4 0 2022    CO2 30 2022     2022    BUN 8 2022    CREATININE 1 03 2022       IMAGIN/30/21  CT - ABDOMEN WITHOUT AND WITH IV CONTRAST     INDICATION:   N28 89: Other specified disorders of kidney and ureter  Echogenic lesion in the right kidney, noted on a sonogram from 2021      COMPARISON: Renal sonogram from May 24, 2021      TECHNIQUE: CT examination of the abdomen was performed  Unenhanced images were obtained followed by enhanced images in the portal venous phase as well as delayed enhanced images  Reformatted images were created in axial, sagittal, and coronal planes        Radiation dose length product (DLP) for this visit:  1177 mGy-cm   This examination, like all CT scans performed in the [facility], was performed utilizing techniques to minimize radiation dose exposure, including the use of iterative   reconstruction and automated exposure control      IV Contrast:  100 mL of iohexol (OMNIPAQUE)  Enteric Contrast:  Enteric contrast was administered      FINDINGS:     ABDOMEN     LOWER CHEST:  No clinically significant abnormality identified in the visualized lower chest      LIVER/BILIARY TREE:  Several subcentimeter cysts  No other evidence of mass   Bile ducts normal in caliber      GALLBLADDER:  At least 2 gallstones, the larger of which measures 3 5 cm  Gallbladder wall normal in thickness  No pericholecystic fluid      SPLEEN:  Unremarkable      PANCREAS:  Unremarkable      ADRENAL GLANDS:  Unremarkable      KIDNEYS/URETERS:  No calculus or hydronephrosis  2 cm enhancing mass in the upper pole of the right kidney with no detectable intralesional fat no other masses      VISUALIZED STOMACH AND BOWEL:  Unremarkable  Normal appendix      VISUALIZED ABDOMINAL CAVITY: No lymphadenopathy or mass  No ascites or discrete fluid collection  No extraluminal gas      VISUALIZED BODY WALL:  Unremarkable      VISUALIZED ABDOMINAL VESSELS: No aneurysm      OSSEOUS STRUCTURES:  No acute fracture or destructive osseous lesion      IMPRESSION:     1   2 cm enhancing mass in the right kidney with no detectable lipid  Although this could be a lipid poor angiomyolipoma, it is best considered a renal cell carcinoma and managed accordingly      2  Cholelithiasis  7/20/21  MRI - ABDOMEN - WITH AND WITHOUT CONTRAST     INDICATION:  Evaluate right renal mass seen on ultrasound      COMPARISON: Renal ultrasound 5/24/2021     TECHNIQUE:  The following pulse sequences were obtained prior to and following the administration of intravenous contrast:  Coronal and axial T2 with TE of 90 and 180 respectively, axial T2 with fat saturation, axial FIESTA fat-sat, axial T1-weighted   in-and-out-of phase, axial DWI/ADC, precontrast axial T1 with fat saturation, post-contrast dynamic axial T1 with fat saturation at 20, 70, and 180 seconds, coronal T1 with fat saturation and 7 minute delayed axial T1 with fat saturation  Pre- and   postcontrast subtraction images were also obtained    Coronal MRCP sequence was also provided      IV Contrast:  8 mL of Gadobutrol injection (SINGLE-DOSE)      FINDINGS:     Unfortunately, the study is degraded by respiratory motion      LOWER CHEST: Unremarkable      LIVER:   Normal in size and configuration  No suspicious mass  Small hepatic cysts are seen  The hepatic veins and portal veins are patent      BILE DUCTS: No intrahepatic or extrahepatic bile duct dilation       GALLBLADDER:  Cholelithiasis      PANCREAS: Normal  No main pancreatic ductal dilation      ADRENAL GLANDS:  Normal      SPLEEN:  Normal      KIDNEYS/PROXIMAL URETERS:   In the anterior upper pole right kidney is a 1 6 x 1 6 cm slightly T2 hyperintense nodule corresponding to recent ultrasound finding  The nodule is iso- to hypointense on T1 and demonstrates internal enhancement on postcontrast imaging  There is no   definitive macroscopic or microscopic fat appreciated within the lesion although images are again degraded by respiratory motion      Tiny cortical cyst mid to lower left kidney  Lower pole left renal calculus suggested on prior ultrasound may not be visible by MRI      BOWEL:   No dilated loops of bowel       PERITONEUM/RETROPERITONEUM: No ascites      LYMPH NODES: No pathologic lymphadenopathy      VASCULAR STRUCTURES:  No aneurysm      ABDOMINAL WALL:  Unremarkable      OSSEOUS STRUCTURES:  No suspicious osseous lesion         IMPRESSION:     Unfortunately, the study is significantly limited by respiratory motion  1 6 cm upper pole right renal nodule remains indeterminate, however, does demonstrate internal enhancement consistent with at least a partially solid lesion  Possibility of renal   cell carcinoma remains in the differential as does lipid poor angiomyolipoma, adenoma as well as oncocytoma  I would recommend a follow-up CT abdomen with IV contrast in 3 months utilizing renal protocol as CT is less susceptible to respiratory motion      Tiny hepatic and left renal cysts      Cholelithiasis      5/24/21  RENAL ULTRASOUND     INDICATION:   N50 811: Right testicular pain      COMPARISON: None     TECHNIQUE:   Ultrasound of the retroperitoneum was performed with a curvilinear transducer utilizing volumetric sweeps and still imaging techniques       FINDINGS:     KIDNEYS:  Symmetric and normal size  Right kidney:  11 3 cm  Left kidney:  11 7 cm      Right kidney  Normal echogenicity and contour  Nonshadowing avascular hyperechoic focus in the upper pole cortex measuring 1 7 x 1 8 x 1 6 cm  No hydronephrosis  No shadowing calculi  No perinephric fluid collections      Left kidney  Normal echogenicity and contour  No suspicious masses detected  No hydronephrosis  4 mm calculus in the lower pole  No perinephric fluid collections      URETERS:  Nonvisualized      BLADDER:   Normally distended  No focal thickening or mass lesions  Bilateral ureteral jets detected         IMPRESSION:     1 8 cm echogenic lesion in the right kidney, suggestive of angiomyolipoma however recommend follow-up MRI to exclude fat-containing renal cell carcinoma      Nonobstructing left renal calculus  PATHOLOGY:     Final Diagnosis   A  Right renal mass, robotic partial nephrectomy (8 grams):  - Renal cell carcinoma, favor clear cell type, ISUP nucleolar grade 2 of 4 - see Note and see synoptic report  * tumor is unifocal, measures 1 9 cm maximal diameter and appears limited to renal cortex without renal capsular invasion  * tumor is negative for necrosis, vascular invasion, sarcomatoid and rhabdoid morphology  * parenchymal partial nephrectomy margins are uninvolved by tumor by 1 millimeter  ASSESSMENT:     38 y o  male with 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

## 2022-05-27 ENCOUNTER — APPOINTMENT (OUTPATIENT)
Dept: LAB | Facility: HOSPITAL | Age: 42
End: 2022-05-27
Payer: COMMERCIAL

## 2022-05-27 DIAGNOSIS — C64.1 RENAL CELL CARCINOMA OF RIGHT KIDNEY (HCC): ICD-10-CM

## 2022-05-27 DIAGNOSIS — N28.89 KIDNEY MASS: ICD-10-CM

## 2022-05-27 DIAGNOSIS — Z13.6 SCREENING FOR ISCHEMIC HEART DISEASE: ICD-10-CM

## 2022-05-27 DIAGNOSIS — Z11.4 SCREENING FOR HUMAN IMMUNODEFICIENCY VIRUS: ICD-10-CM

## 2022-05-27 LAB
ALBUMIN SERPL BCP-MCNC: 4.1 G/DL (ref 3.5–5)
ALP SERPL-CCNC: 61 U/L (ref 46–116)
ALT SERPL W P-5'-P-CCNC: 14 U/L (ref 12–78)
ANION GAP SERPL CALCULATED.3IONS-SCNC: 9 MMOL/L (ref 4–13)
AST SERPL W P-5'-P-CCNC: 9 U/L (ref 5–45)
BILIRUB SERPL-MCNC: 0.68 MG/DL (ref 0.2–1)
BUN SERPL-MCNC: 10 MG/DL (ref 5–25)
CALCIUM SERPL-MCNC: 8.8 MG/DL (ref 8.3–10.1)
CHLORIDE SERPL-SCNC: 103 MMOL/L (ref 100–108)
CHOLEST SERPL-MCNC: 126 MG/DL
CO2 SERPL-SCNC: 29 MMOL/L (ref 21–32)
CREAT SERPL-MCNC: 0.99 MG/DL (ref 0.6–1.3)
GFR SERPL CREATININE-BSD FRML MDRD: 92 ML/MIN/1.73SQ M
GLUCOSE P FAST SERPL-MCNC: 118 MG/DL (ref 65–99)
HCV AB SER QL: NORMAL
HDLC SERPL-MCNC: 73 MG/DL
LDLC SERPL CALC-MCNC: 43 MG/DL (ref 0–100)
NONHDLC SERPL-MCNC: 53 MG/DL
POTASSIUM SERPL-SCNC: 4.2 MMOL/L (ref 3.5–5.3)
PROT SERPL-MCNC: 7.5 G/DL (ref 6.4–8.2)
SODIUM SERPL-SCNC: 141 MMOL/L (ref 136–145)
TRIGL SERPL-MCNC: 52 MG/DL

## 2022-05-27 PROCEDURE — 36415 COLL VENOUS BLD VENIPUNCTURE: CPT

## 2022-05-27 PROCEDURE — 80053 COMPREHEN METABOLIC PANEL: CPT

## 2022-05-27 PROCEDURE — 80061 LIPID PANEL: CPT

## 2022-05-27 PROCEDURE — 86803 HEPATITIS C AB TEST: CPT

## 2022-05-27 PROCEDURE — 87389 HIV-1 AG W/HIV-1&-2 AB AG IA: CPT

## 2022-05-29 LAB — HIV 1+2 AB+HIV1 P24 AG SERPL QL IA: NORMAL

## 2022-06-01 ENCOUNTER — TELEPHONE (OUTPATIENT)
Dept: PULMONOLOGY | Facility: CLINIC | Age: 42
End: 2022-06-01

## 2022-08-08 ENCOUNTER — HOSPITAL ENCOUNTER (OUTPATIENT)
Dept: RADIOLOGY | Facility: HOSPITAL | Age: 42
Discharge: HOME | End: 2022-08-08
Payer: COMMERCIAL

## 2022-08-08 DIAGNOSIS — C64.1 RENAL CELL CARCINOMA OF RIGHT KIDNEY (HCC): ICD-10-CM

## 2022-08-08 PROCEDURE — 71046 X-RAY EXAM CHEST 2 VIEWS: CPT

## 2022-08-15 ENCOUNTER — HOSPITAL ENCOUNTER (OUTPATIENT)
Dept: CT IMAGING | Facility: HOSPITAL | Age: 42
Discharge: HOME | End: 2022-08-15
Attending: UROLOGY
Payer: COMMERCIAL

## 2022-08-15 DIAGNOSIS — C64.1 RENAL CELL CARCINOMA OF RIGHT KIDNEY (HCC): ICD-10-CM

## 2022-08-15 PROCEDURE — 74170 CT ABD WO CNTRST FLWD CNTRST: CPT

## 2022-08-15 PROCEDURE — G1004 CDSM NDSC: HCPCS

## 2022-08-15 RX ADMIN — IOHEXOL 75 ML: 350 INJECTION, SOLUTION INTRAVENOUS at 15:27

## 2022-08-23 ENCOUNTER — TELEPHONE (OUTPATIENT)
Dept: UROLOGY | Facility: CLINIC | Age: 42
End: 2022-08-23

## 2022-08-23 ENCOUNTER — OFFICE VISIT (OUTPATIENT)
Dept: UROLOGY | Facility: CLINIC | Age: 42
End: 2022-08-23
Payer: COMMERCIAL

## 2022-08-23 VITALS
DIASTOLIC BLOOD PRESSURE: 70 MMHG | HEART RATE: 77 BPM | WEIGHT: 194 LBS | SYSTOLIC BLOOD PRESSURE: 108 MMHG | BODY MASS INDEX: 24.9 KG/M2 | HEIGHT: 74 IN

## 2022-08-23 DIAGNOSIS — C64.1 RENAL CELL CARCINOMA OF RIGHT KIDNEY (HCC): Primary | ICD-10-CM

## 2022-08-23 DIAGNOSIS — Z12.5 PROSTATE CANCER SCREENING: ICD-10-CM

## 2022-08-23 DIAGNOSIS — N50.819 TESTICULAR DISCOMFORT: ICD-10-CM

## 2022-08-23 PROBLEM — N28.89 RIGHT RENAL MASS: Status: RESOLVED | Noted: 2021-08-31 | Resolved: 2022-08-23

## 2022-08-23 LAB
BACTERIA UR QL AUTO: ABNORMAL /HPF
BILIRUB UR QL STRIP: NEGATIVE
CLARITY UR: CLEAR
COLOR UR: ABNORMAL
GLUCOSE UR STRIP-MCNC: NEGATIVE MG/DL
HGB UR QL STRIP.AUTO: ABNORMAL
KETONES UR STRIP-MCNC: NEGATIVE MG/DL
LEUKOCYTE ESTERASE UR QL STRIP: NEGATIVE
NITRITE UR QL STRIP: NEGATIVE
NON-SQ EPI CELLS URNS QL MICRO: ABNORMAL /HPF
PH UR STRIP.AUTO: 5.5 [PH]
PROT UR STRIP-MCNC: NEGATIVE MG/DL
RBC #/AREA URNS AUTO: ABNORMAL /HPF
SP GR UR STRIP.AUTO: 1.01 (ref 1–1.03)
UROBILINOGEN UR STRIP-ACNC: <2 MG/DL
WBC #/AREA URNS AUTO: ABNORMAL /HPF

## 2022-08-23 PROCEDURE — 87086 URINE CULTURE/COLONY COUNT: CPT | Performed by: NURSE PRACTITIONER

## 2022-08-23 PROCEDURE — 81001 URINALYSIS AUTO W/SCOPE: CPT | Performed by: NURSE PRACTITIONER

## 2022-08-23 PROCEDURE — 99214 OFFICE O/P EST MOD 30 MIN: CPT | Performed by: NURSE PRACTITIONER

## 2022-08-23 NOTE — ASSESSMENT & PLAN NOTE
· Intermittent pinching sensation  · Ultrasound scrotum and testicles with spermatocele 05/2021  · Send urine microscopic and culture  · Scrotal support, elevation recommended  · Follow-up 1 year

## 2022-08-23 NOTE — ASSESSMENT & PLAN NOTE
· PSA ordered for 2 weeks  · Denies family history of prostate cancer  · Can resume prostate cancer screening age 46

## 2022-08-23 NOTE — PROGRESS NOTES
Assessment and plan:     Renal cell carcinoma of right kidney (HCC)  · pT1a; clear cell renal cell carcinoma  · S/p robotic partial nephrectomy on the right April 2022  · Chest x-ray unremarkable  · 3 month CT scan no recurrent or metastatic disease  · Chest x-ray, CT scan, BMP 1 year  · Follow-up 1 year    Prostate cancer screening  · PSA ordered for 2 weeks  · Denies family history of prostate cancer  · Can resume prostate cancer screening age 46    Testicular discomfort  · Intermittent pinching sensation  · Ultrasound scrotum and testicles with spermatocele 05/2021  · Send urine microscopic and culture  · Scrotal support, elevation recommended  · Follow-up 1 year          LAKISHA Salomon    History of Present Illness     Zen Herrera is a 38 y o  male patient of Dr Tessa Cormier with hearing impairment, presenting alone  todays visit was done with written communication  Patient intially presented with testicular pain in May 2021  The ultrasound scrotum and testicles revealed a small left epididymal cyst or spermatocele  Otherwise unremarkable  He reports intermittent pinching sensation  Will send urine for testing today  Renal ultrasound demonstrated concern for a potential angiomyolipoma of the right kidney  MRI was performed in follow-up  Unfortunately this study is significantly limited due to respiratory motion  Due to motion during MRI he underwent CT scan    which revealed a 2 cm enhancing mass in the upper pole of the right kidney with no detectable intralesional fat or other masses     Patient underwen an uncomplicated robotic partial nephrectomy on April 25th 2022    he is doing well postop  No incisional complaints  Denies any hematuria or urinary symptoms  Denies any flank pain  Reports occasional pinching sensation in the testicles  Had prior ultrasound of the scrotum and testicles that was unremarkable other than possible spermatocele    Will send urine for testing today Laboratory     Lab Results   Component Value Date    BUN 10 05/27/2022    CREATININE 0 99 05/27/2022       No components found for: GFR    Lab Results   Component Value Date    CALCIUM 8 8 05/27/2022    K 4 2 05/27/2022    CO2 29 05/27/2022     05/27/2022       Lab Results   Component Value Date    WBC 7 97 04/26/2022    HGB 12 4 04/26/2022    HCT 37 3 04/26/2022    MCV 90 04/26/2022     04/26/2022     Surgical Pathology Report                         Case: [id number]                                    Authorizing Provider: Marnie Robles MD   Collected:           04/25/2022 0924               Ordering Location:     [facility]        Received:            04/25/2022 [address] [place] [address] Operating Room                                                      Pathologist:           Davy Hodges MD                                                     Specimen:    Kidney, Right, RIGHT RENAL MASS                                                            Final Diagnosis   A  Right renal mass, robotic partial nephrectomy (8 grams):  - Renal cell carcinoma, favor clear cell type, ISUP nucleolar grade 2 of 4 - see Note and see synoptic report  * tumor is unifocal, measures 1 9 cm maximal diameter and appears limited to renal cortex without renal capsular invasion  * tumor is negative for necrosis, vascular invasion, sarcomatoid and rhabdoid morphology  * parenchymal partial nephrectomy margins are uninvolved by tumor by 1 millimeter  Electronically signed by Davy Hodges MD on 5/12/2022 at  8:58 AM   Note    Routine H&E sections demonstrate a partially cystic clear cell epithelioid neoplasm in which delicate "chicken wire" shaped vessels divided lobules of tumor cells    Immunohistochemical stains demonstrate the following tumor cell immunophenotype:   * Positive: pancytokeratinAE-1/AE-3 & carbonic anhydrase, each in a completely circumferential cytoplasmic membrane distribution, CK7 and CK10 (cytoplasmic membrane in 80% subset)  * Negative: , p504s racemase & HMB-45  Composite morphologic & immunophenotypic findings support the diagnosis of renal cell carcinoma, favor clear cell type  The strong CK7 immunoreactivity of tumor cells leaves open the possibility of a component of clear cell variant of papillary renal cell carcinoma, although the morphology is more characteristic of typical clear cell renal cell carcinoma  Intradepartmental consultation concurs with the diagnosis of renal cell carcinoma           SPECIMEN   Procedure  Partial nephrectomy    Specimen Laterality  Right    TUMOR   Tumor Focality  Unifocal    Tumor Site  Upper pole    Tumor Size  Greatest Dimension (Centimeters): 1 9 cm   Histologic Type  Clear cell renal cell carcinoma    Histologic Type Comment  questionable component of clear cell variant of papillary renal cell carcinoma    Histologic Grade (WHO / ISUP)  G2 (nucleoli conspicuous and eosinophilic at 300B magnification, visible but not prominent at 100x magnification)    Tumor Extent  Limited to kidney    Sarcomatoid Features  Not identified    Rhabdoid Features  Not identified    Tumor Necrosis  Not identified    Lymphovascular Invasion  Not identified    MARGINS   Margin Status  All margins negative for invasive carcinoma    REGIONAL LYMPH NODES   Regional Lymph Node Status  Not applicable (no regional lymph nodes submitted or found)    PATHOLOGIC STAGE CLASSIFICATION (pTNM, AJCC 8th Edition)   Reporting of pT, pN, and (when applicable) pM categories is based on information available to the pathologist at the time the report is issued  As per the AJCC (Chapter 1, 8th Ed ) it is the managing physicians responsibility to establish the final pathologic stage based upon all pertinent information, including but potentially not limited to this pathology report     Primary Tumor (pT)  pT1a    Regional Lymph Nodes (pN)  pN not assigned (no nodes submitted or found)    ADDITIONAL FINDINGS   Additional Findings in Nonneoplastic Kidney  None identified    Comment(s)  see Note            No results found for: PSA    No results found for this or any previous visit (from the past 1 hour(s))  @RESULT(URINEMICROSCOPIC)@    @RESULT(URINECULTURE)@    Radiology   CHEST 08/08/2022     INDICATION:   C64 1: Malignant neoplasm of right kidney, except renal pelvis      COMPARISON:  12/7/2021     EXAM PERFORMED/VIEWS:  XR CHEST PA & LATERAL  Images: 5     FINDINGS:     Cardiomediastinal silhouette appears unremarkable      The lungs are clear  No pneumothorax or pleural effusion      Osseous structures appear within normal limits for patient age      IMPRESSION:     No acute cardiopulmonary disease      Findings are stable  CT ABDOMEN AND PELVIS WITHOUT AND WITH IV CONTRAST 08/15/2022     INDICATION:   C64 1: Malignant neoplasm of right kidney, except renal pelvis  History of partial nephrectomy  Renal cell carcinoma      COMPARISON:  11/30/2021     TECHNIQUE:  CT examination of the abdomen was performed  In addition to portal venous phase postcontrast scanning through the abdomen and pelvis, delayed phase postcontrast scanning was performed through the upper abdominal viscera  Axial, sagittal,   and coronal 2D reformatted images were created from the source data and submitted for interpretation      Radiation dose length product (DLP) for this visit:  1110 mGy-cm     This examination, like all CT scans performed in the [facility], was performed utilizing techniques to minimize radiation dose exposure, including the use of iterative   reconstruction and automated exposure control      IV Contrast:  75 mL of iohexol (OMNIPAQUE)  Enteric Contrast:  Enteric contrast was administered      FINDINGS:     LOWER CHEST:  No clinically significant abnormality identified in the visualized lower chest      LIVER/BILIARY TREE:  Similar scattered, circumscribed hypodensities too small to characterize, statistically likely a cyst   Normal hepatic morphology and bile duct caliber  No suspicious enhancement      GALLBLADDER:  Similar gallstones without secondary signs of acute cholecystitis      SPLEEN:  Within normal limits      PANCREAS:  Within normal limits      ADRENAL GLANDS:  Within normal limits      KIDNEYS/URETERS:    New right upper pole partial nephrectomy with resolution of mass      Otherwise both kidneys are within normal limits      STOMACH AND BOWEL:  Within normal limits      APPENDIX:  Within normal limits      ABDOMINOPELVIC CAVITY:  No abnormal air, fluid or enlarged lymph nodes      VESSELS:  Within normal limits      ABDOMINAL WALL:  Within normal limits      OSSEOUS STRUCTURES:  No acute or suspicious findings            IMPRESSION:     Compared to 11/30/2021, interval partial right nephrectomy with resolution of right renal mass  No evidence of recurrent or metastatic disease      SCROTAL ULTRASOUND 5/24/2021     INDICATION:    N50 811: Right testicular pain      COMPARISON: None     TECHNIQUE:   Ultrasound the scrotal contents was performed with a high frequency linear transducer utilizing volumetric sweep imaging as well as standard still image techniques  Imaging performed in longitudinal and transverse orientation  Color and   spectral Doppler evaluation also performed bilaterally      FINDINGS:     TESTES:   Testes are symmetric and normal in size      RIGHT testis = 4 5 x 2 0 x 2 6 cm   Normal contour with homogeneous smooth echotexture  No intratesticular mass lesion or calcifications      LEFT testis = 2 9 x 1 4 x 3 1 cm  Normal contour with homogeneous smooth echotexture  No intratesticular mass lesion or calcifications      Doppler flow within both testes is present and symmetric      EPIDIDYMIDES:   Normal Size  Doppler ultrasound demonstrates normal blood flow    Small left epididymal head cyst or spermatocele, measuring 1 1 x 0 7 x 0 8 cm  Otherwise unremarkable      HYDROCELE:  No significant fluid present      VARICOCELE:  None present      SCROTUM:  Scrotal thickness and appearance within normal limits  No evidence for extratesticular mass or hernia demonstrated      IMPRESSION:     Normal testes      Small left epididymal head cyst or spermatocele       Workstation performed: [id number]     Review of Systems     Review of Systems   Constitutional: Negative for activity change, appetite change, chills, fatigue, fever and unexpected weight change  HENT: Negative for facial swelling  Eyes: Negative for discharge  Respiratory: Negative  Negative for cough and shortness of breath  Cardiovascular: Negative for chest pain and leg swelling  Gastrointestinal: Negative  Negative for abdominal distention, abdominal pain, constipation, diarrhea, nausea and vomiting  Endocrine: Negative  Genitourinary: Negative  Negative for decreased urine volume, difficulty urinating, dysuria, enuresis, flank pain, frequency, genital sores, hematuria, scrotal swelling, testicular pain and urgency  Occasional pinching sensation in the testicles   Musculoskeletal: Negative for back pain and myalgias  Skin: Negative for pallor and rash  Allergic/Immunologic: Negative  Negative for immunocompromised state  Neurological: Negative for facial asymmetry and speech difficulty  Psychiatric/Behavioral: Negative for agitation and confusion  AUA SYMPTOM SCORE    Flowsheet Row Most Recent Value   AUA SYMPTOM SCORE    How often have you had a sensation of not emptying your bladder completely after you finished urinating? 0   How often have you had to urinate again less than two hours after you finished urinating? 2   How often have you found you stopped and started again several times when you urinate? 0   How often have you found it difficult to postpone urination?  0   How often have you had a weak urinary stream? 0   How often have you had to push or strain to begin urination? 0   How many times did you most typically get up to urinate from the time you went to bed at night until the time you got up in the morning? 3   Quality of Life: If you were to spend the rest of your life with your urinary condition just the way it is now, how would you feel about that? 3   AUA SYMPTOM SCORE 5                Allergies     No Known Allergies    Physical Exam     Physical Exam  Vitals reviewed  Constitutional:       General: He is not in acute distress  Appearance: Normal appearance  He is normal weight  He is not ill-appearing, toxic-appearing or diaphoretic  HENT:      Head: Normocephalic and atraumatic  Eyes:      General: No scleral icterus  Cardiovascular:      Rate and Rhythm: Normal rate  Pulmonary:      Effort: Pulmonary effort is normal  No respiratory distress  Abdominal:      General: Abdomen is flat  There is no distension  Palpations: Abdomen is soft  Tenderness: There is no abdominal tenderness  Genitourinary:     Penis: Circumcised  No hypospadias, erythema, tenderness, discharge, swelling or lesions  Testes:         Right: Mass, tenderness or swelling not present  Right testis is descended  Left: Mass, tenderness or swelling not present  Left testis is descended  Epididymis:      Right: Not inflamed  No tenderness  Left: Not inflamed  No tenderness  Comments: Prostate smooth nontender without appreciable nodules or induration  Musculoskeletal:         General: No swelling  Cervical back: Normal range of motion  Skin:     General: Skin is warm and dry  Coloration: Skin is not jaundiced or pale  Findings: No rash  Neurological:      General: No focal deficit present  Mental Status: He is alert and oriented to person, place, and time        Gait: Gait normal    Psychiatric:         Mood and Affect: Mood normal          Behavior: Behavior normal          Thought Content: Thought content normal          Judgment: Judgment normal          Vital Signs     Vitals:    08/23/22 0918   BP: 108/70   Pulse: 77   Weight: 88 kg (194 lb)   Height: 6' 2" (1 88 m)       Current Medications     No current outpatient medications on file  Active Problems     Patient Active Problem List   Diagnosis    Hearing impaired person, bilateral    Renal cell carcinoma of right kidney ([address] )    Prostate cancer screening    Testicular discomfort       Past Medical History     Past Medical History:   Diagnosis Date    Cancer [facility])     Hearing impaired person, bilateral     uses sign language  needs        Surgical History     Past Surgical History:   Procedure Laterality Date    BACK SURGERY      NV LAP,PARTIAL NEPHRECTOMY Right 4/25/2022    Procedure: ROBOTIC LAPAROSCOPIC PARTIAL RIGHT NEPHRECTOMY;  Surgeon: Marnie Robles MD;  Location: [facility];  Service: Urology       Family History     Family History   Problem Relation Age of Onset    Diabetes Mother     Diabetes Father        Social History     Social History     Social History     Tobacco Use   Smoking Status Never Smoker   Smokeless Tobacco Never Used       Past Surgical History:   Procedure Laterality Date    BACK SURGERY      NV LAP,PARTIAL NEPHRECTOMY Right 4/25/2022    Procedure: ROBOTIC LAPAROSCOPIC PARTIAL RIGHT NEPHRECTOMY;  Surgeon: Marnie Robles MD;  Location: [facility];  Service: Urology         The following portions of the patient's history were reviewed and updated as appropriate: allergies, current medications, past family history, past medical history, past social history, past surgical history and problem list    Please note :  Voice dictation software has been used to create this document  There may be inadvertent transcription errors      [id number] [id number] Nicole Villalobos

## 2022-08-23 NOTE — ASSESSMENT & PLAN NOTE
· pT1a; clear cell renal cell carcinoma  · S/p robotic partial nephrectomy on the right April 2022  · Chest x-ray unremarkable  · 3 month CT scan no recurrent or metastatic disease  · Chest x-ray, CT scan, BMP 1 year  · Follow-up 1 year

## 2022-08-23 NOTE — PATIENT INSTRUCTIONS
We can do your PSA blood test in about 2 weeks, no bicycle riding, motorcycle riding, tractor riding or ejaculation for at least 3 days prior to going for testing  This is a test to screening for prostate cancer    Will repeat your chest x-ray in 1 year  CT scan of your abdomen with contrast in 1 year  You will have your kidney function tested prior to your CT scan in 1 year, this is a blood test  Follow-up 1 year    BLADDER HEALTH    WHAT IS CONSIDERED NORMAL? The average bladder can hold about 2 cups of urine before it needs to be emptied  The normal range of voiding urine is 6 to 8 times during a 24 hour period  As we get older, our bladder capacity can get smaller and we may need to pass urine more frequently but usually not more than every 2 hours  Urine should flow easily without discomfort in a good, steady stream until the bladder is empty  No pushing or straining is necessary to empty the bladder  An urge is a signal that you feel as the bladder stretches to fill with urine  Urges can be felt even if the bladder is not full  Urges are not commands to go to the toilet, merely a signal and can be controlled  WHAT ARE GOOD BLADDER HABITS? Take your time when emptying your bladder  Dont strain or push to empty your bladder  Make sure you empty your bladder completely each time you pass urine  Do not rush the process  Consistently ignoring the urge to go (waiting more than 4 hours between toileting) or urinating too infrequently may be convenient but not healthy for your bladder  Avoid going to the toilet just in case or more often than every 2 hours  It is usually not necessary to go when you feel the first urge  Try to go only when your bladder is full  Urgency and frequency of urination can be improved by retraining the bladder and spacing your fluid intake throughout the day  Practice good toilet habits  Dont let your bladder control your life      TIPS TO MAINTAIN GOOD BLADDER HABITS  Maintain a good fluid intake  Depending on your body size and environment, drink 6 -8 cups (8 oz each) of fluid per day unless otherwise advised by your doctor  Not enough fluid creates a foul odor and dark color of the urine  Limit the amount of caffeine (coffee, cola, chocolate or tea) and citrus foods that you consume as these foods can be associated with increased sensation of urinary urgency and frequency  Limit the amount of alcohol you drink  Alcohol increases urine production and makes it difficult for the brain to coordinate bladder control  Avoid constipation by maintaining a balanced diet of dietary fiber  Cigarette smoking is also irritating to the bladder surface and is associated with bladder cancer  In addition, the coughing associated with smoking may lead to increased incontinent episodes because of the increased pressure  HOW DIET CAN AFFECT YOUR BLADDER  Although there is no particular "diet" that can cure bladder control, there are certain dietary suggestions you can use to help control the problem  There are 2 points to consider when evaluating how your habits and diet may affect your bladder:    Foods and fluids can irritate the bladder  Some foods and beverages are thought to contribute to bladder leakage and irritability  However their effect on the bladder is not completely understood and you may want to see if eliminating one or all of these items improves your bladder control      If you are unable to give them up completely, it is recommended that you use the following items in moderation:  Acidic beverages and foods (orange juice, grapefruit juice, lemonade etc)  Alcoholic beverages  Vinegar  Coffee (regular and decaf)  Tea (regular and decaf)  Caffeinated beverages  Carbonated beverages          Drinking enough and the right kinds of fluids  Many people with bladder control issues decrease their intake of liquids in hope that they will need to urinate less frequently or have less urinary leakage  You should not restrict fluids to control your bladder  While a decrease in liquid intake does result in a decrease in the volume of urine, the smaller amount of urine may be more highly concentrated  Highly concentrated, dark yellow urine is irritating to the bladder surface and may actually cause you to go to the bathroom more frequently  It also encourages the growth of bacteria, which may lead to infections resulting in incontinence  Substitutions for Bladder Irritants: water is always the best beverage choice  Grape and apple juice are thirst quenchers are good selections and are not as irritating to the bladder    Low acid fruits:  Pears, apricots, papaya, watermelon  For coffee drinkers: KAVA®, Postum®, Jaren®, Kaffree Bita®  For tea drinkers:  non-citrus or herbal and sun brewed tea

## 2022-08-24 LAB — BACTERIA UR CULT: NORMAL

## 2022-10-22 PROBLEM — Z12.5 PROSTATE CANCER SCREENING: Status: RESOLVED | Noted: 2022-08-23 | Resolved: 2022-10-22

## 2023-08-16 ENCOUNTER — HOSPITAL ENCOUNTER (OUTPATIENT)
Dept: RADIOLOGY | Facility: HOSPITAL | Age: 43
Discharge: HOME | End: 2023-08-16
Payer: COMMERCIAL

## 2023-08-16 ENCOUNTER — LAB (OUTPATIENT)
Dept: LAB | Facility: HOSPITAL | Age: 43
End: 2023-08-16
Payer: COMMERCIAL

## 2023-08-16 DIAGNOSIS — Z12.5 PROSTATE CANCER SCREENING: ICD-10-CM

## 2023-08-16 DIAGNOSIS — C64.1 RENAL CELL CARCINOMA OF RIGHT KIDNEY (HCC): ICD-10-CM

## 2023-08-16 LAB
ANION GAP SERPL CALCULATED.3IONS-SCNC: 6 MMOL/L
BUN SERPL-MCNC: 12 MG/DL (ref 5–25)
CALCIUM SERPL-MCNC: 9.3 MG/DL (ref 8.4–10.2)
CHLORIDE SERPL-SCNC: 102 MMOL/L (ref 96–108)
CO2 SERPL-SCNC: 29 MMOL/L (ref 21–32)
CREAT SERPL-MCNC: 0.96 MG/DL (ref 0.6–1.3)
GFR SERPL CREATININE-BSD FRML MDRD: 95 ML/MIN/1.73SQ M
GLUCOSE SERPL-MCNC: 106 MG/DL (ref 65–140)
POTASSIUM SERPL-SCNC: 3.9 MMOL/L (ref 3.5–5.3)
PSA SERPL-MCNC: 0.42 NG/ML (ref 0–4)
SODIUM SERPL-SCNC: 137 MMOL/L (ref 135–147)

## 2023-08-16 PROCEDURE — 71046 X-RAY EXAM CHEST 2 VIEWS: CPT

## 2023-08-16 PROCEDURE — 36415 COLL VENOUS BLD VENIPUNCTURE: CPT

## 2023-08-16 PROCEDURE — G0103 PSA SCREENING: HCPCS

## 2023-08-16 PROCEDURE — 80048 BASIC METABOLIC PNL TOTAL CA: CPT

## 2023-08-23 ENCOUNTER — HOSPITAL ENCOUNTER (OUTPATIENT)
Dept: CT IMAGING | Facility: HOSPITAL | Age: 43
Discharge: HOME | End: 2023-08-23
Payer: COMMERCIAL

## 2023-08-23 DIAGNOSIS — C64.1 RENAL CELL CARCINOMA OF RIGHT KIDNEY (HCC): ICD-10-CM

## 2023-08-23 PROCEDURE — 74160 CT ABDOMEN W/CONTRAST: CPT

## 2023-08-23 PROCEDURE — G1004 CDSM NDSC: HCPCS

## 2023-08-23 RX ADMIN — IOHEXOL 100 ML: 350 INJECTION, SOLUTION INTRAVENOUS at 08:17

## 2023-09-26 ENCOUNTER — TELEPHONE (OUTPATIENT)
Dept: UROLOGY | Facility: CLINIC | Age: 43
End: 2023-09-26

## 2023-09-26 ENCOUNTER — OFFICE VISIT (OUTPATIENT)
Dept: UROLOGY | Facility: CLINIC | Age: 43
End: 2023-09-26
Payer: COMMERCIAL

## 2023-09-26 VITALS
WEIGHT: 196 LBS | BODY MASS INDEX: 25.16 KG/M2 | DIASTOLIC BLOOD PRESSURE: 70 MMHG | SYSTOLIC BLOOD PRESSURE: 122 MMHG | HEART RATE: 82 BPM

## 2023-09-26 DIAGNOSIS — C64.1 RENAL CELL CARCINOMA OF RIGHT KIDNEY (HCC): Primary | ICD-10-CM

## 2023-09-26 PROCEDURE — 99213 OFFICE O/P EST LOW 20 MIN: CPT

## 2023-09-26 NOTE — PROGRESS NOTES
Office Visit- Urology  Johnna Coe 1980 MRN: [id number]      Assessment/Discussion/Plan    37 y.o. male managed by     1. 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 No lesions visualized  -Plan for repeat CT scan in a year as well as chest x-ray in August 2024 with follow up at that time     2. Prostate Cancer Screening  -PSA came back at 0.4  -no family history of prostate cancer  -Reinitiate prostate cancer screening when patient turns 46 with annual PSA/MEGHAN    3. Testicular discomfort  -Resolved        Chief Complaint:   Brenda Lobo is a 37 y.o. male presenting to the office for a follow up visit regarding  Right-sided renal cell carcinoma        Subjective    History time at last visit  Johnna Coe is a 38 y.o. male patient of Dr. Vish Blue with hearing impairment, presenting alone. todays visit was done with written communication.   Patient intially presented with testicular pain in May 2021. The ultrasound scrotum and testicles revealed a small left epididymal cyst or spermatocele. Otherwise unremarkable. He reports intermittent pinching sensation. Will send urine for testing today. Renal ultrasound demonstrated concern for a potential angiomyolipoma of the right kidney.  MRI was performed in follow-up. Unfortunately this study is significantly limited due to respiratory motion.  Due to motion during MRI he underwent CT scan.  which revealed a 2 cm enhancing mass in the upper pole of the right kidney with no detectable intralesional fat or other masses. Patient underwen an uncomplicated robotic partial nephrectomy on April 25th 2022.   he is doing well postop. No incisional complaints. Denies any hematuria or urinary symptoms. Denies any flank pain.      Hx today  Patient presented to the office today for follow-up  -He has no concerns at this time.  -Had a CT scan which did not demonstrate any recurrence nor did chest x-ray demonstrate any concerning signs for pulmonary mets  -He denies any dysuria, gross hematuria, flank pain, bothersome urinary symptoms  -He states that his testicular discomfort has resolved      AUA SYMPTOM SCORE    Flowsheet Row Most Recent Value   AUA SYMPTOM SCORE    How often have you had a sensation of not emptying your bladder completely after you finished urinating? 0 (P)     How often have you had to urinate again less than two hours after you finished urinating? 1 (P)     How often have you found you stopped and started again several times when you urinate? 0 (P)     How often have you found it difficult to postpone urination? 0 (P)     How often have you had a weak urinary stream? 0 (P)     How often have you had to push or strain to begin urination? 0 (P)     How many times did you most typically get up to urinate from the time you went to bed at night until the time you got up in the morning? 3 (P)     Quality of Life: If you were to spend the rest of your life with your urinary condition just the way it is now, how would you feel about that? 2 (P)     AUA SYMPTOM SCORE 4 (P)           ROS:   Review of Systems   Constitutional: Negative. Negative for chills, fatigue and fever. HENT: Negative. Respiratory: Negative for shortness of breath. Cardiovascular: Negative for chest pain. Gastrointestinal: Negative. Negative for abdominal pain. Endocrine: Negative. Musculoskeletal: Negative. Skin: Negative. Neurological: Negative. Negative for dizziness and light-headedness. Hematological: Negative. Psychiatric/Behavioral: Negative.           Past Medical History  Past Medical History:   Diagnosis Date   • Cancer [facility])     kidney   • Hearing impaired person, bilateral     uses sign language  needs        Past Surgical History  Past Surgical History: Procedure Laterality Date   • BACK SURGERY     • NM LAPAROSCOPY SURG PARTIAL NEPHRECTOMY Right 4/25/2022    Procedure: ROBOTIC LAPAROSCOPIC PARTIAL RIGHT NEPHRECTOMY;  Surgeon: Dianna Owusu MD;  Location: [facility];  Service: Urology       Past Family History  Family History   Problem Relation Age of Onset   • Diabetes Mother    • Diabetes Father        Past Social history  Social History     Socioeconomic History   • Marital status: Single     Spouse name: Not on file   • Number of children: Not on file   • Years of education: Not on file   • Highest education level: Not on file   Occupational History   • Not on file   Tobacco Use   • Smoking status: Never   • Smokeless tobacco: Never   Vaping Use   • Vaping Use: Never used   Substance and Sexual Activity   • Alcohol use: Never   • Drug use: Never   • Sexual activity: Not Currently   Other Topics Concern   • Not on file   Social History Narrative   • Not on file     Social Determinants of Health     Financial Resource Strain: Not on file   Food Insecurity: Not on file   Transportation Needs: Not on file   Physical Activity: Not on file   Stress: Not on file   Social Connections: Not on file   Intimate Partner Violence: Not on file   Housing Stability: Not on file       Current Medications  No current outpatient medications on file. No current facility-administered medications for this visit. Allergies  No Known Allergies    OBJECTIVE    Vitals   Vitals:    09/26/23 1024   BP: 122/70   BP Location: Left arm   Patient Position: Sitting   Cuff Size: Adult   Pulse: 82   Weight: 88.9 kg (196 lb)       PVR:    Physical Exam  Constitutional:       General: He is not in acute distress. Appearance: Normal appearance. He is normal weight. He is not ill-appearing or toxic-appearing. HENT:      Head: Normocephalic and atraumatic. Eyes:      Conjunctiva/sclera: Conjunctivae normal.   Cardiovascular:      Rate and Rhythm: Normal rate.    Pulmonary: Effort: Pulmonary effort is normal. No respiratory distress. Neurological:      General: No focal deficit present. Mental Status: He is alert and oriented to person, place, and time. Cranial Nerves: No cranial nerve deficit. Psychiatric:         Mood and Affect: Mood normal.         Behavior: Behavior normal.         Thought Content: Thought content normal.          Labs:     Lab Results   Component Value Date    PSA 0.42 08/16/2023     Lab Results   Component Value Date    CREATININE 0.96 08/16/2023      No results found for: "HGBA1C"  Lab Results   Component Value Date    CALCIUM 9.3 08/16/2023    K 3.9 08/16/2023    CO2 29 08/16/2023     08/16/2023    BUN 12 08/16/2023    CREATININE 0.96 08/16/2023       I have personally reviewed all pertinent lab results and reviewed with patient    Imaging   CT ABDOMEN WITH IV CONTRAST     INDICATION:   C64.1: Malignant neoplasm of right kidney, except renal pelvis.     COMPARISON: 8/15/2022; 11/30/2021     TECHNIQUE:  CT examination of the abdomen was performed after the administration of intravenous contrast. Multiplanar 2D reformatted images were created from the source data.     This examination, like all CT scans performed in the [facility], was performed utilizing techniques to minimize radiation dose exposure, including the use of iterative reconstruction and automated exposure control. Radiation dose length   product (DLP) for this visit:  287 mGy-cm     IV Contrast:  100 mL of iohexol (OMNIPAQUE)  Enteric Contrast:  Enteric contrast was not administered.     FINDINGS:        LOWER CHEST:  No clinically significant abnormality identified in the visualized lower chest.     LIVER/BILIARY TREE: Stable 9 mm hypodensity in the dome of the liver. New hypodensity which was not present on the prior MRI may represent a cyst, but is too small to characterize.  Attention at follow-up CTs.     GALLBLADDER: There are gallstone(s) within the gallbladder, without pericholecystic inflammatory changes.     SPLEEN:  Unremarkable.     PANCREAS:  Unremarkable.     ADRENAL GLANDS:  Unremarkable.     KIDNEYS/URETERS: Status post partial right upper pole nephrectomy without evidence for local recurrence. No hydronephrosis.     VISUALIZED STOMACH AND BOWEL:  Unremarkable.     ABDOMINAL CAVITY:  No ascites. No pneumoperitoneum. No lymphadenopathy.     VESSELS:  Unremarkable for patient's age.     ABDOMINAL WALL:  Unremarkable.     OSSEOUS STRUCTURES:  No acute fracture or destructive osseous lesion.     IMPRESSION:     Status post partial right nephrectomy without evidence for local recurrence.     Cholelithiasis.     New hepatic hypodensity which is too small to characterize with and can be evaluated at the time of the patient's next follow-up scan.           CHEST     INDICATION:   C64.1: Malignant neoplasm of right kidney, except renal pelvis.     COMPARISON: Chest radiograph 8/8/2022.     EXAM PERFORMED/VIEWS:  XR CHEST PA & LATERAL        FINDINGS:     Cardiomediastinal silhouette appears unremarkable.     The lungs are clear. No pneumothorax or pleural effusion.     Osseous structures appear within normal limits for patient age.     IMPRESSION:     No acute cardiopulmonary disease.     Randy Yarbrough PA-C  Date: 9/26/2023 Time: 10:38 AM  [facility]    This note was written using fluency dictation software. Please excuse any resulting minor grammatical errors.

## 2024-06-17 ENCOUNTER — APPOINTMENT (OUTPATIENT)
Dept: LAB | Facility: HOSPITAL | Age: 44
End: 2024-06-17
Payer: COMMERCIAL

## 2024-06-17 DIAGNOSIS — R53.83 OTHER FATIGUE: ICD-10-CM

## 2024-06-17 DIAGNOSIS — Z00.00 ROUTINE GENERAL MEDICAL EXAMINATION AT A HEALTH CARE FACILITY: ICD-10-CM

## 2024-06-17 DIAGNOSIS — Z13.6 SCREENING FOR CARDIOVASCULAR CONDITION: ICD-10-CM

## 2024-06-17 LAB
ALBUMIN SERPL BCP-MCNC: 4.1 G/DL (ref 3.5–5)
ALP SERPL-CCNC: 34 U/L (ref 34–104)
ALT SERPL W P-5'-P-CCNC: 9 U/L (ref 7–52)
ANION GAP SERPL CALCULATED.3IONS-SCNC: 4 MMOL/L (ref 4–13)
AST SERPL W P-5'-P-CCNC: 12 U/L (ref 13–39)
BASOPHILS # BLD AUTO: 0.02 THOUSANDS/ÂΜL (ref 0–0.1)
BASOPHILS NFR BLD AUTO: 1 % (ref 0–1)
BILIRUB SERPL-MCNC: 0.5 MG/DL (ref 0.2–1)
BUN SERPL-MCNC: 14 MG/DL (ref 5–25)
CALCIUM SERPL-MCNC: 8.8 MG/DL (ref 8.4–10.2)
CHLORIDE SERPL-SCNC: 107 MMOL/L (ref 96–108)
CHOLEST SERPL-MCNC: 111 MG/DL
CO2 SERPL-SCNC: 28 MMOL/L (ref 21–32)
CREAT SERPL-MCNC: 0.96 MG/DL (ref 0.6–1.3)
EOSINOPHIL # BLD AUTO: 0.09 THOUSAND/ÂΜL (ref 0–0.61)
EOSINOPHIL NFR BLD AUTO: 2 % (ref 0–6)
ERYTHROCYTE [DISTWIDTH] IN BLOOD BY AUTOMATED COUNT: 11.5 % (ref 11.6–15.1)
GFR SERPL CREATININE-BSD FRML MDRD: 94 ML/MIN/1.73SQ M
GLUCOSE P FAST SERPL-MCNC: 103 MG/DL (ref 65–99)
HCT VFR BLD AUTO: 39.7 % (ref 36.5–49.3)
HDLC SERPL-MCNC: 79 MG/DL
HGB BLD-MCNC: 13.4 G/DL (ref 12–17)
IMM GRANULOCYTES # BLD AUTO: 0.01 THOUSAND/UL (ref 0–0.2)
IMM GRANULOCYTES NFR BLD AUTO: 0 % (ref 0–2)
LDLC SERPL CALC-MCNC: 26 MG/DL (ref 0–100)
LYMPHOCYTES # BLD AUTO: 1.67 THOUSANDS/ÂΜL (ref 0.6–4.47)
LYMPHOCYTES NFR BLD AUTO: 38 % (ref 14–44)
MCH RBC QN AUTO: 29.8 PG (ref 26.8–34.3)
MCHC RBC AUTO-ENTMCNC: 33.8 G/DL (ref 31.4–37.4)
MCV RBC AUTO: 88 FL (ref 82–98)
MONOCYTES # BLD AUTO: 0.55 THOUSAND/ÂΜL (ref 0.17–1.22)
MONOCYTES NFR BLD AUTO: 13 % (ref 4–12)
NEUTROPHILS # BLD AUTO: 2.05 THOUSANDS/ÂΜL (ref 1.85–7.62)
NEUTS SEG NFR BLD AUTO: 46 % (ref 43–75)
NONHDLC SERPL-MCNC: 32 MG/DL
NRBC BLD AUTO-RTO: 0 /100 WBCS
PLATELET # BLD AUTO: 200 THOUSANDS/UL (ref 149–390)
PMV BLD AUTO: 9.8 FL (ref 8.9–12.7)
POTASSIUM SERPL-SCNC: 4.1 MMOL/L (ref 3.5–5.3)
PROT SERPL-MCNC: 6.7 G/DL (ref 6.4–8.4)
RBC # BLD AUTO: 4.49 MILLION/UL (ref 3.88–5.62)
SODIUM SERPL-SCNC: 139 MMOL/L (ref 135–147)
TRIGL SERPL-MCNC: 28 MG/DL
TSH SERPL DL<=0.05 MIU/L-ACNC: 0.59 UIU/ML (ref 0.45–4.5)
WBC # BLD AUTO: 4.39 THOUSAND/UL (ref 4.31–10.16)

## 2024-06-17 PROCEDURE — 80053 COMPREHEN METABOLIC PANEL: CPT

## 2024-06-17 PROCEDURE — 85025 COMPLETE CBC W/AUTO DIFF WBC: CPT

## 2024-06-17 PROCEDURE — 36415 COLL VENOUS BLD VENIPUNCTURE: CPT

## 2024-06-17 PROCEDURE — 84443 ASSAY THYROID STIM HORMONE: CPT

## 2024-06-17 PROCEDURE — 80061 LIPID PANEL: CPT

## 2024-09-16 ENCOUNTER — APPOINTMENT (OUTPATIENT)
Dept: LAB | Facility: HOSPITAL | Age: 44
End: 2024-09-16
Payer: COMMERCIAL

## 2024-09-16 ENCOUNTER — HOSPITAL ENCOUNTER (OUTPATIENT)
Dept: RADIOLOGY | Facility: HOSPITAL | Age: 44
Discharge: HOME | End: 2024-09-16
Payer: COMMERCIAL

## 2024-09-16 DIAGNOSIS — C64.1 RENAL CELL CARCINOMA OF RIGHT KIDNEY (HCC): ICD-10-CM

## 2024-09-16 LAB
ANION GAP SERPL CALCULATED.3IONS-SCNC: 5 MMOL/L (ref 4–13)
BUN SERPL-MCNC: 14 MG/DL (ref 5–25)
CALCIUM SERPL-MCNC: 9 MG/DL (ref 8.4–10.2)
CHLORIDE SERPL-SCNC: 105 MMOL/L (ref 96–108)
CO2 SERPL-SCNC: 30 MMOL/L (ref 21–32)
CREAT SERPL-MCNC: 0.89 MG/DL (ref 0.6–1.3)
GFR SERPL CREATININE-BSD FRML MDRD: 102 ML/MIN/1.73SQ M
GLUCOSE P FAST SERPL-MCNC: 109 MG/DL (ref 65–99)
POTASSIUM SERPL-SCNC: 4.5 MMOL/L (ref 3.5–5.3)
SODIUM SERPL-SCNC: 140 MMOL/L (ref 135–147)

## 2024-09-16 PROCEDURE — 71046 X-RAY EXAM CHEST 2 VIEWS: CPT

## 2024-09-16 PROCEDURE — 80048 BASIC METABOLIC PNL TOTAL CA: CPT

## 2024-09-16 PROCEDURE — 36415 COLL VENOUS BLD VENIPUNCTURE: CPT

## 2024-09-20 ENCOUNTER — APPOINTMENT (EMERGENCY)
Dept: CT IMAGING | Facility: HOSPITAL | Age: 44
End: 2024-09-20
Payer: COMMERCIAL

## 2024-09-20 ENCOUNTER — HOSPITAL ENCOUNTER (EMERGENCY)
Facility: HOSPITAL | Age: 44
Discharge: HOME | End: 2024-09-20
Attending: EMERGENCY MEDICINE
Payer: COMMERCIAL

## 2024-09-20 VITALS
RESPIRATION RATE: 18 BRPM | OXYGEN SATURATION: 97 % | TEMPERATURE: 97.5 F | SYSTOLIC BLOOD PRESSURE: 137 MMHG | DIASTOLIC BLOOD PRESSURE: 86 MMHG | HEART RATE: 90 BPM

## 2024-09-20 DIAGNOSIS — N30.90 CYSTITIS: ICD-10-CM

## 2024-09-20 DIAGNOSIS — R31.9 HEMATURIA: Primary | ICD-10-CM

## 2024-09-20 LAB
ALBUMIN SERPL BCG-MCNC: 4.9 G/DL (ref 3.5–5)
ALP SERPL-CCNC: 41 U/L (ref 34–104)
ALT SERPL W P-5'-P-CCNC: 13 U/L (ref 7–52)
ANION GAP SERPL CALCULATED.3IONS-SCNC: 8 MMOL/L (ref 4–13)
AST SERPL W P-5'-P-CCNC: 17 U/L (ref 13–39)
BACTERIA UR QL AUTO: ABNORMAL /HPF
BASOPHILS # BLD AUTO: 0.03 THOUSANDS/ΜL (ref 0–0.1)
BASOPHILS NFR BLD AUTO: 1 % (ref 0–1)
BILIRUB SERPL-MCNC: 1.08 MG/DL (ref 0.2–1)
BILIRUB UR QL STRIP: NEGATIVE
BUDDING YEAST: PRESENT
BUN SERPL-MCNC: 18 MG/DL (ref 5–25)
CALCIUM SERPL-MCNC: 10 MG/DL (ref 8.4–10.2)
CHLORIDE SERPL-SCNC: 98 MMOL/L (ref 96–108)
CLARITY UR: ABNORMAL
CO2 SERPL-SCNC: 31 MMOL/L (ref 21–32)
COLOR UR: ABNORMAL
CREAT SERPL-MCNC: 1.04 MG/DL (ref 0.6–1.3)
EOSINOPHIL # BLD AUTO: 0.06 THOUSAND/ΜL (ref 0–0.61)
EOSINOPHIL NFR BLD AUTO: 1 % (ref 0–6)
ERYTHROCYTE [DISTWIDTH] IN BLOOD BY AUTOMATED COUNT: 11.9 % (ref 11.6–15.1)
GFR SERPL CREATININE-BSD FRML MDRD: 85 ML/MIN/1.73SQ M
GLUCOSE SERPL-MCNC: 120 MG/DL (ref 65–140)
GLUCOSE UR STRIP-MCNC: NEGATIVE MG/DL
HCT VFR BLD AUTO: 39.3 % (ref 36.5–49.3)
HGB BLD-MCNC: 13.7 G/DL (ref 12–17)
HGB UR QL STRIP.AUTO: ABNORMAL
IMM GRANULOCYTES # BLD AUTO: 0.01 THOUSAND/UL (ref 0–0.2)
IMM GRANULOCYTES NFR BLD AUTO: 0 % (ref 0–2)
KETONES UR STRIP-MCNC: ABNORMAL MG/DL
LEUKOCYTE ESTERASE UR QL STRIP: ABNORMAL
LYMPHOCYTES # BLD AUTO: 1.81 THOUSANDS/ΜL (ref 0.6–4.47)
LYMPHOCYTES NFR BLD AUTO: 42 % (ref 14–44)
MCH RBC QN AUTO: 30.4 PG (ref 26.8–34.3)
MCHC RBC AUTO-ENTMCNC: 34.9 G/DL (ref 31.4–37.4)
MCV RBC AUTO: 87 FL (ref 82–98)
MONOCYTES # BLD AUTO: 0.73 THOUSAND/ΜL (ref 0.17–1.22)
MONOCYTES NFR BLD AUTO: 17 % (ref 4–12)
MUCOUS THREADS UR QL AUTO: ABNORMAL
NEUTROPHILS # BLD AUTO: 1.66 THOUSANDS/ΜL (ref 1.85–7.62)
NEUTS SEG NFR BLD AUTO: 39 % (ref 43–75)
NITRITE UR QL STRIP: NEGATIVE
NON-SQ EPI CELLS URNS QL MICRO: ABNORMAL /HPF
NRBC BLD AUTO-RTO: 0 /100 WBCS
PH UR STRIP.AUTO: 5.5 [PH]
PLATELET # BLD AUTO: 222 THOUSANDS/UL (ref 149–390)
PMV BLD AUTO: 9.2 FL (ref 8.9–12.7)
POTASSIUM SERPL-SCNC: 3.8 MMOL/L (ref 3.5–5.3)
PROT SERPL-MCNC: 7.4 G/DL (ref 6.4–8.4)
PROT UR STRIP-MCNC: ABNORMAL MG/DL
RBC # BLD AUTO: 4.5 MILLION/UL (ref 3.88–5.62)
RBC #/AREA URNS AUTO: ABNORMAL /HPF
SODIUM SERPL-SCNC: 137 MMOL/L (ref 135–147)
SP GR UR STRIP.AUTO: 1.02 (ref 1–1.03)
UROBILINOGEN UR STRIP-ACNC: <2 MG/DL
WBC # BLD AUTO: 4.3 THOUSAND/UL (ref 4.31–10.16)
WBC #/AREA URNS AUTO: ABNORMAL /HPF
WBC CLUMPS # UR AUTO: PRESENT /UL

## 2024-09-20 PROCEDURE — 80053 COMPREHEN METABOLIC PANEL: CPT

## 2024-09-20 PROCEDURE — 99284 EMERGENCY DEPT VISIT MOD MDM: CPT

## 2024-09-20 PROCEDURE — 85025 COMPLETE CBC W/AUTO DIFF WBC: CPT

## 2024-09-20 PROCEDURE — 81001 URINALYSIS AUTO W/SCOPE: CPT

## 2024-09-20 PROCEDURE — 74177 CT ABD & PELVIS W/CONTRAST: CPT

## 2024-09-20 PROCEDURE — 87086 URINE CULTURE/COLONY COUNT: CPT

## 2024-09-20 PROCEDURE — 36415 COLL VENOUS BLD VENIPUNCTURE: CPT

## 2024-09-20 RX ORDER — FLUCONAZOLE 100 MG/1
200 TABLET ORAL ONCE
Status: COMPLETED | OUTPATIENT
Start: 2024-09-20 | End: 2024-09-20

## 2024-09-20 RX ORDER — CEPHALEXIN 500 MG/1
500 CAPSULE ORAL EVERY 6 HOURS SCHEDULED
Qty: 28 CAPSULE | Refills: 0 | Status: SHIPPED | OUTPATIENT
Start: 2024-09-20 | End: 2024-09-27

## 2024-09-20 RX ADMIN — FLUCONAZOLE 200 MG: 100 TABLET ORAL at 10:28

## 2024-09-20 RX ADMIN — CEPHALEXIN 500 MG: 250 CAPSULE ORAL at 10:28

## 2024-09-20 RX ADMIN — IOHEXOL 100 ML: 350 INJECTION, SOLUTION INTRAVENOUS at 08:39

## 2024-09-20 NOTE — ED PROVIDER NOTES
1. Hematuria    2. Cystitis      ED Disposition       ED Disposition   Discharge    Condition   --    Date/Time   Fri Sep 20, 2024 11:05 AM    Comment   Parker Rahman discharge to home/self care.                   Assessment & Plan       Medical Decision Making  44-year-old male presents to the ED for evaluation of hematuria X approximately 5 days.  Patient does have a history of renal cell carcinoma, currently follows with urology for the same issue.  Patient afebrile with normal vital signs in ED, overall well-appearing on exam.  Given history of renal cell carcinoma with gross hematuria did opt for CT abdomen and pelvis in ED which was concerning for possible cystitis, no signs of recurrent renal masses.  CT abdomen pelvis was concerning for cystitis.  Urinalysis was concerning for RBCs, clumped WBCs, and budding yeast.  Patient given one-time dose of fluconazole for yeast in urine.  Patient given prescription for Keflex, first dose in ED, remaining prescription sent to pharmacy.  Ambulatory referral to urology placed.  I did emphasize the importance of close follow-up with urology as well as patient's PCP for further evaluation and management.  Strict ED return precautions discussed with patient.  Through , patient verbalized understanding and agreement with plan.    Amount and/or Complexity of Data Reviewed  Labs: ordered. Decision-making details documented in ED Course.  Radiology: ordered. Decision-making details documented in ED Course.    Risk  Prescription drug management.      Chief Complaint   Patient presents with    Blood in Urine     Pt c/o blood in urine since Monday, denies any pain, c/o slight burning when urinating.      Past Medical History:   Diagnosis Date    Cancer (HCC)     kidney    Hearing impaired person, bilateral     uses sign language  needs      Past Surgical History:   Procedure Laterality Date    BACK SURGERY      [place] LAPAROSCOPY SURG PARTIAL NEPHRECTOMY  Right 4/25/2022    Procedure: ROBOTIC LAPAROSCOPIC PARTIAL RIGHT NEPHRECTOMY;  Surgeon: Lucio Rinaldi MD;  Location: [facility];  Service: Urology     Social History     Socioeconomic History    Marital status: Single     Spouse name: Not on file    Number of children: Not on file    Years of education: Not on file    Highest education level: Not on file   Occupational History    Not on file   Tobacco Use    Smoking status: Never    Smokeless tobacco: Never   Vaping Use    Vaping status: Never Used   Substance and Sexual Activity    Alcohol use: Never    Drug use: Never    Sexual activity: Not Currently   Other Topics Concern    Not on file   Social History Narrative    Not on file     Social Determinants of Health     Financial Resource Strain: Low Risk  (6/3/2024)    Received from [facility]    Overall Financial Resource Strain (CARDIA)     Difficulty of Paying Living Expenses: Not very hard   Food Insecurity: No Food Insecurity (6/3/2024)    Received from [facility]    Hunger Vital Sign     Worried About Running Out of Food in the Last Year: Never true     Ran Out of Food in the Last Year: Never true   Transportation Needs: Unknown (6/3/2024)    Received from [facility]    PRAPARE - Transportation     Lack of Transportation (Medical): No     Lack of Transportation (Non-Medical): Patient declined   Physical Activity: Not on file   Stress: No Stress Concern Present (6/3/2024)    Received from [facility]    [employer] [place] [employer] - Occupational Stress Questionnaire     Feeling of Stress : Only a little   Social Connections: Feeling Socially Integrated (6/3/2024)    Received from [facility]    OASIS : Social Isolation     How often do you feel lonely or isolated from those around you?: Never   Intimate Partner Violence: Unknown (6/3/2024)    Received from [facility]     Humiliation, Afraid, Rape, and Kick questionnaire     Fear of Current or Ex-Partner: No     Emotionally Abused: Patient declined     Physically Abused: Patient declined     Sexually Abused: Patient declined   Housing Stability: Unknown (6/3/2024)    Received from [facility]    Housing Stability Vital Sign     Unable to Pay for Housing in the Last Year: No     Number of Times Moved in the Last Year: Not on file     Homeless in the Last Year: Not on file              ED Course as of 09/20/24 1421   Fri Sep 20, 2024   1018 Urine Microscopic(!)   1018 Budding Yeast: Present   1018 CT abdomen pelvis with contrast  IMPRESSION:     1.  Urinary bladder wall appears mildly thickened and trabeculated, even when accounting for degree of underdistention. This could relate to chronic partial outlet obstruction by the prostate though infectious cystitis can cause a similar appearance.     2.  Otherwise, no acute abdominopelvic findings or abnormalities to explain hematuria. Specifically, no recurrent renal masses.         Medications   iohexol (OMNIPAQUE) 350 MG/ML injection (MULTI-DOSE) 100 mL (100 mL Intravenous Given 9/20/24 0839)   fluconazole (DIFLUCAN) tablet 200 mg (200 mg Oral Given 9/20/24 1028)   cephalexin (KEFLEX) capsule 500 mg (500 mg Oral Given 9/20/24 1028)       History of Present Illness       This is a 44-year-old male, hearing impaired, history of right-sided renal cell carcinoma who presents to the ED for evaluation of hematuria X approximately 5 days.  Patient reports seeing bright red blood in his urine, occasional dark urine, occasionally notices small clots.  Reports this has been ongoing for the last several days, does report a mild burning sensation with urination.  Denies any sensation of urinary retention, denies any discharge from the tip of his penis, denies any penile or testicular pain or discomfort.  Per chart review, patient does have history of renal cell carcinoma, had a mass  removed by urology, currently follows with urology for this, not currently receiving any treatment.  Patient states he feels fine otherwise, denies any recent unexplained weight loss, denies any significant fatigue or generalized weakness, denies night sweats or loss of appetite.  He denies any recent fevers, chills, lightheadedness, headaches, chest pain, SOB, abdominal pain, nausea, vomiting, diarrhea, flank pain.  Patient denies any concern for STDs at this time.             used: Yes ([place] Sign language)    Blood in Urine  Associated symptoms include dysuria (Mild burning with urination). Pertinent negatives include no abdominal pain, chills, fever, flank pain, nausea or vomiting.       Review of Systems   Constitutional:  Negative for chills and fever.   HENT: Negative.     Eyes:  Negative for photophobia and visual disturbance.   Respiratory:  Negative for cough and shortness of breath.    Cardiovascular:  Negative for chest pain and palpitations.   Gastrointestinal:  Negative for abdominal pain, diarrhea, nausea and vomiting.   Genitourinary:  Positive for dysuria (Mild burning with urination) and hematuria. Negative for difficulty urinating, flank pain, genital sores, penile discharge, penile pain, penile swelling, scrotal swelling and testicular pain.   Musculoskeletal:  Negative for arthralgias, neck pain and neck stiffness.   Skin:  Negative for rash and wound.   Neurological:  Negative for dizziness, syncope, weakness, light-headedness and headaches.           Objective     ED Triage Vitals   Temperature Pulse Blood Pressure Respirations SpO2 Patient Position - Orthostatic VS   09/20/24 0652 09/20/24 0652 09/20/24 0652 09/20/24 0652 09/20/24 0652 09/20/24 0907   97.5 °F (36.4 °C) 94 134/77 18 100 % Sitting      Temp src Heart Rate Source BP Location FiO2 (%) Pain Score    -- 09/20/24 0652 09/20/24 0907 -- 09/20/24 0652     Monitor Right arm  No Pain        Physical Exam  Vitals and  nursing note reviewed.   Constitutional:       General: He is not in acute distress.     Appearance: Normal appearance. He is well-developed. He is not ill-appearing, toxic-appearing or diaphoretic.   HENT:      Head: Normocephalic and atraumatic.   Eyes:      Conjunctiva/sclera: Conjunctivae normal.   Cardiovascular:      Rate and Rhythm: Normal rate and regular rhythm.      Heart sounds: No murmur heard.  Pulmonary:      Effort: Pulmonary effort is normal. No respiratory distress.      Breath sounds: Normal breath sounds.   Abdominal:      Palpations: Abdomen is soft.      Tenderness: There is no abdominal tenderness. There is no right CVA tenderness or left CVA tenderness.   Musculoskeletal:         General: No swelling.      Cervical back: Normal range of motion and neck supple.   Skin:     General: Skin is warm and dry.      Capillary Refill: Capillary refill takes less than 2 seconds.   Neurological:      General: No focal deficit present.      Mental Status: He is alert and oriented to person, place, and time.   Psychiatric:         Mood and Affect: Mood normal.         Labs Reviewed   CBC AND DIFFERENTIAL - Abnormal       Result Value    WBC 4.30 (*)     RBC 4.50      Hemoglobin 13.7      Hematocrit 39.3      MCV 87      MCH 30.4      MCHC 34.9      RDW 11.9      MPV 9.2      Platelets 222      nRBC 0      Segmented % 39 (*)     Immature Grans % 0      Lymphocytes % 42      Monocytes % 17 (*)     Eosinophils Relative 1      Basophils Relative 1      Absolute Neutrophils 1.66 (*)     Absolute Immature Grans 0.01      Absolute Lymphocytes 1.81      Absolute Monocytes 0.73      Eosinophils Absolute 0.06      Basophils Absolute 0.03     COMPREHENSIVE METABOLIC PANEL - Abnormal    Sodium 137      Potassium 3.8      Chloride 98      CO2 31      ANION GAP 8      BUN 18      Creatinine 1.04      Glucose 120      Calcium 10.0      AST 17      ALT 13      Alkaline Phosphatase 41      Total Protein 7.4      Albumin 4.9       Total Bilirubin 1.08 (*)     eGFR 85      Narrative:     National Kidney Disease Foundation guidelines for Chronic Kidney Disease (CKD):     Stage 1 with normal or high GFR (GFR > 90 mL/min/1.73 square meters)    Stage 2 Mild CKD (GFR = 60-89 mL/min/1.73 square meters)    Stage 3A Moderate CKD (GFR = 45-59 mL/min/1.73 square meters)    Stage 3B Moderate CKD (GFR = 30-44 mL/min/1.73 square meters)    Stage 4 Severe CKD (GFR = 15-29 mL/min/1.73 square meters)    Stage 5 End Stage CKD (GFR <15 mL/min/1.73 square meters)  Note: GFR calculation is accurate only with a steady state creatinine   UA W REFLEX TO MICROSCOPIC WITH REFLEX TO CULTURE - Abnormal    Color, UA Dark Brown      Clarity, UA Extra Turbid      Specific Gravity, UA 1.025      pH, UA 5.5      Leukocytes, UA Trace (*)     Nitrite, UA Negative      Protein,  (2+) (*)     Glucose, UA Negative      Ketones, UA Trace (*)     Urobilinogen, UA <2.0      Bilirubin, UA Negative      Occult Blood, UA Large (*)    URINE MICROSCOPIC - Abnormal    RBC, UA Innumerable (*)     WBC, UA Innumerable (*)     Epithelial Cells None Seen      Bacteria, UA Innumerable (*)     MUCUS THREADS Moderate (*)     WBC Clumps Present      Budding Yeast Present     URINE CULTURE     CT abdomen pelvis with contrast   Final Interpretation by Javi Ling MD (09/20 0906)      1.  Urinary bladder wall appears mildly thickened and trabeculated, even when accounting for degree of underdistention. This could relate to chronic partial outlet obstruction by the prostate though infectious cystitis can cause a similar appearance.      2.  Otherwise, no acute abdominopelvic findings or abnormalities to explain hematuria. Specifically, no recurrent renal masses.         Workstation performed: [id number]             Procedures    ED Medication and Procedure Management   None     Discharge Medication List as of 9/20/2024 10:43 AM        START taking these medications    Details    cephalexin (KEFLEX) 500 mg capsule Take 1 capsule (500 mg total) by mouth every 6 (six) hours for 7 days, Starting Fri 9/20/2024, Until Fri 9/27/2024, Normal                Javi Lee PA-C  09/20/24 1426

## 2024-09-20 NOTE — DISCHARGE INSTRUCTIONS
Please take your Keflex 4 times daily as directed.  Please follow-up with urology as well as your primary care provider for further evaluation and management.  Return to the ED if you develop any new or worsening symptoms as discussed prior to your discharge.

## 2024-09-21 LAB — BACTERIA UR CULT: NORMAL

## 2024-09-23 ENCOUNTER — HOSPITAL ENCOUNTER (OUTPATIENT)
Dept: CT IMAGING | Facility: HOSPITAL | Age: 44
Discharge: HOME | End: 2024-09-23
Payer: COMMERCIAL

## 2024-09-23 DIAGNOSIS — C64.1 RENAL CELL CARCINOMA OF RIGHT KIDNEY (HCC): ICD-10-CM

## 2024-09-23 PROCEDURE — G1004 CDSM NDSC: HCPCS

## 2024-09-23 PROCEDURE — 74170 CT ABD WO CNTRST FLWD CNTRST: CPT

## 2024-09-23 RX ADMIN — IOHEXOL 90 ML: 350 INJECTION, SOLUTION INTRAVENOUS at 08:08

## 2024-10-03 ENCOUNTER — OFFICE VISIT (OUTPATIENT)
Dept: UROLOGY | Facility: CLINIC | Age: 44
End: 2024-10-03
Payer: COMMERCIAL

## 2024-10-03 VITALS
BODY MASS INDEX: 24.51 KG/M2 | DIASTOLIC BLOOD PRESSURE: 70 MMHG | HEART RATE: 74 BPM | HEIGHT: 74 IN | SYSTOLIC BLOOD PRESSURE: 110 MMHG | OXYGEN SATURATION: 98 % | WEIGHT: 191 LBS

## 2024-10-03 DIAGNOSIS — N30.90 CYSTITIS: ICD-10-CM

## 2024-10-03 DIAGNOSIS — H91.93 HEARING IMPAIRED PERSON, BILATERAL: ICD-10-CM

## 2024-10-03 DIAGNOSIS — C64.1 RENAL CELL CARCINOMA OF RIGHT KIDNEY (HCC): Primary | ICD-10-CM

## 2024-10-03 DIAGNOSIS — R31.0 GROSS HEMATURIA: ICD-10-CM

## 2024-10-03 PROBLEM — N50.819 TESTICULAR DISCOMFORT: Status: RESOLVED | Noted: 2022-08-23 | Resolved: 2024-10-03

## 2024-10-03 PROCEDURE — 99213 OFFICE O/P EST LOW 20 MIN: CPT | Performed by: PHYSICIAN ASSISTANT

## 2024-10-03 NOTE — ASSESSMENT & PLAN NOTE
Gross hematuria September 2024, for several days  UA with pyuria, bacteriuria, yeast  completed keflex/diflucan  UCX was ultimately no growth  bladder wall thickening on CT possible cystitis  symptoms have resolved  recommend cystoscopy for evaluation he agrees  Orders:    Ambulatory Referral to Urology

## 2024-10-03 NOTE — PROGRESS NOTES
Ambulatory Visit  Name: Parker Rahman      : 1980      MRN: [id number]  Encounter Provider: Mili Craven PA-C  Encounter Date: 10/3/2024   Encounter department: [facility] UROLOGY [place]    Assessment & Plan  Gross hematuria  Gross hematuria 2024, for several days  UA with pyuria, bacteriuria, yeast  completed keflex/diflucan  UCX was ultimately no growth  bladder wall thickening on CT possible cystitis  symptoms have resolved  recommend cystoscopy for evaluation he agrees  Orders:    Ambulatory Referral to Urology    Cystitis  painless hematuria, bladder wall thickening on CT  ua suggestive of UTI but Ucx no growth  no symptoms now, after abx completed  Orders:    Ambulatory Referral to Urology    Renal cell carcinoma of right kidney (HCC)  right partial nephrectomy 2022 (urszula)  CXR and CT abdomen with no evidence of residual/recurrent/metastatic disease       Hearing impaired person, bilateral   used on [employer] ipad for visit today         History of Present Illness     Parker Rahman is a 44 y.o. male who presents annual followup right renal cell carcinoma s/p partial nephrectomy 2022. [facility] annual surveillance chest and abdomen imaging is performed. He recently had episode of gross hematuria lasted 5-7 days. He thought maybe dehydrated not drinking enough water or too much tea. Seen in ER. CT repeated showing some bladder wall thickening concerning for cystitis, No renal abnormalities.. UA showed pyuria and budding yeast. He took keflex and diflucan and symptoms resolved. The culture was no growth. Urine clear yellow the past week. He says he had a UTI once before about eight years ago and had cystoscopy for that no findings.    History obtained from : patient  Review of Systems   Constitutional: Negative.    Respiratory: Negative.     Cardiovascular: Negative.    Genitourinary:  Negative for decreased urine volume, difficulty urinating,  "dysuria, flank pain, frequency, hematuria, testicular pain and urgency.   Musculoskeletal: Negative.            AUA SYMPTOM SCORE      Flowsheet Row Most Recent Value   AUA SYMPTOM SCORE    How often have you had a sensation of not emptying your bladder completely after you finished urinating? 3 (P)     How often have you had to urinate again less than two hours after you finished urinating? 3 (P)     How often have you found you stopped and started again several times when you urinate? 3 (P)     How often have you found it difficult to postpone urination? 0 (P)     How often have you had a weak urinary stream? 0 (P)     How often have you had to push or strain to begin urination? 0 (P)     How many times did you most typically get up to urinate from the time you went to bed at night until the time you got up in the morning? 3 (P)     Quality of Life: If you were to spend the rest of your life with your urinary condition just the way it is now, how would you feel about that? 3 (P)     AUA SYMPTOM SCORE 12 (P)            Objective     /70 (BP Location: Left arm, Patient Position: Sitting, Cuff Size: Adult)   Pulse 74   Ht 6' 2\" (1.88 m)   Wt 86.6 kg (191 lb)   SpO2 98%   BMI 24.52 kg/m²   Physical Exam  Vitals and nursing note reviewed.   Constitutional:       General: He is not in acute distress.     Appearance: He is well-developed. He is not diaphoretic.   HENT:      Head: Normocephalic and atraumatic.   Pulmonary:      Effort: Pulmonary effort is normal.   Abdominal:      Tenderness: There is no abdominal tenderness.   Musculoskeletal:         General: No edema. Normal range of motion.   Skin:     General: Skin is warm.   Neurological:      Mental Status: He is alert and oriented to person, place, and time.   Psychiatric:         Mood and Affect: Mood and affect normal.       Results  Lab Results   Component Value Date    PSA 0.42 08/16/2023     Lab Results   Component Value Date    CALCIUM 10.0 " 09/20/2024    K 3.8 09/20/2024    CO2 31 09/20/2024    CL 98 09/20/2024    BUN 18 09/20/2024    CREATININE 1.04 09/20/2024     Lab Results   Component Value Date    WBC 4.30 (L) 09/20/2024    HGB 13.7 09/20/2024    HCT 39.3 09/20/2024    MCV 87 09/20/2024     09/20/2024       Office Urine Dip  No results found for this or any previous visit (from the past 1 hour(s)).]    Administrative Statements

## 2024-10-03 NOTE — ASSESSMENT & PLAN NOTE
right partial nephrectomy april 2022 (urszula)  CXR and CT abdomen with no evidence of residual/recurrent/metastatic disease

## 2024-10-03 NOTE — ASSESSMENT & PLAN NOTE
painless hematuria, bladder wall thickening on CT  ua suggestive of UTI but Ucx no growth  no symptoms now, after abx completed  Orders:    Ambulatory Referral to Urology

## 2024-11-01 NOTE — PROGRESS NOTES
Ambulatory Visit  Name: Parker Rahman      : 1980      MRN: [id number]  Encounter Provider: Poitr Lemos MD  Encounter Date: 2024   Encounter department: [facility] UROLOGY [place]    Assessment & Plan  Gross hematuria  Gross hematuria was explained to the patient. His urine culture was negative so evaluation today is to rule out alternative causes.    Cystoscopy negative. Recent CT scans did not have delayed imaging but otherwise showed no concerning findings.     Gross hematuria now resolved. Possibly related to UTI. No entirely clear when his culture was collected relative to starting antibiotics.     - He will let us know if he has recurrence of gross hematuria. I would obtain delayed imaging at that point.     Orders:    Basic metabolic panel; Future    Renal cell carcinoma of right kidney (HCC)  History of G2 ccRCC of the right kidney s/p R robotic-assisted laparoscopic partial nephrectomy.   No evidence of recurrent or metastatic disease.     - Will obtain CT abdomen, BMP, and CXR in 1 year as part of surveillance    Orders:    XR chest pa and lateral; Future    CT abdomen w wo contrast; Future      History of Present Illness     Parker Rahman is a 44 y.o. male who presents for evaluation of gross hematuria. He has a history of renal cell carcinoma s/p robotic partial nephrectomy in . He has had no evidence of recurrent disease on imaging from this year.     He had painless gross hematuria in 2024 for several days with a UA concerning for infection. He was treated with keflex and diflucan with resolution of his hematuria. Urine culture was no growth. CT scan showed bladder wall thickening concerning for cystitis.    History obtained from : patient    AUA SYMPTOM SCORE      Flowsheet Row Most Recent Value   AUA SYMPTOM SCORE    How often have you had a sensation of not emptying your bladder completely after you finished urinating? 1 (P)     How often have you had to  "urinate again less than two hours after you finished urinating? 3 (P)     How often have you found you stopped and started again several times when you urinate? 1 (P)     How often have you found it difficult to postpone urination? 0 (P)     How often have you had a weak urinary stream? 0 (P)     How often have you had to push or strain to begin urination? 0 (P)     How many times did you most typically get up to urinate from the time you went to bed at night until the time you got up in the morning? 3 (P)     Quality of Life: If you were to spend the rest of your life with your urinary condition just the way it is now, how would you feel about that? 3 (P)     AUA SYMPTOM SCORE 8 (P)              Objective     /60 (BP Location: Left arm, Patient Position: Sitting, Cuff Size: Adult)   Pulse 87   Ht 6' 2\" (1.88 m)   Wt 87.1 kg (192 lb)   SpO2 98%   BMI 24.65 kg/m²   Physical Exam  Vitals:    11/04/24 0959   BP: 100/60   Pulse: 87   SpO2: 98%      General: Well appearing, no acute distress   HEENT: normocephalic, atraumatic  Eyes: extraocular movements intact  Cardiovascular: normal rate   Respiratory: no respiratory distress, effort normal   Abdominal: Non-distended, non-tender   : See cystoscopy note  MSK: Grossly normal range of motion   Neurological: No gross focal deficits  Behavioral: Normal mood and affect     Results  Lab Results   Component Value Date    PSA 0.42 08/16/2023     Lab Results   Component Value Date    CALCIUM 10.0 09/20/2024    K 3.8 09/20/2024    CO2 31 09/20/2024    CL 98 09/20/2024    BUN 18 09/20/2024    CREATININE 1.04 09/20/2024     Lab Results   Component Value Date    WBC 4.30 (L) 09/20/2024    HGB 13.7 09/20/2024    HCT 39.3 09/20/2024    MCV 87 09/20/2024     09/20/2024       Imaging  I have personally reviewed pertinent films in PACS.    CT ABDOMEN PELVIS W CONTRAST  1.  Urinary bladder wall appears mildly thickened and trabeculated, even when accounting for degree " of underdistention. This could relate to chronic partial outlet obstruction by the prostate though infectious cystitis can cause a similar appearance.    2.  Otherwise, no acute abdominopelvic findings or abnormalities to explain hematuria. Specifically, no recurrent renal masses.      Workstation performed: [id number]      Office Urine Dip  No results found for this or any previous visit (from the past 1 hour(s)).]    Administrative Statements        Cystoscopy     Date/Time  11/4/2024 10:00 AM     Performed by  Piotr Lemos MD   Authorized by  Piotr Lemos MD     Universal Protocol:  procedure performed by consultantConsent: Verbal consent obtained. Written consent obtained.  Risks and benefits: risks, benefits and alternatives were discussed  Consent given by: patient  Patient understanding: patient states understanding of the procedure being performed  Patient consent: the patient's understanding of the procedure matches consent given  Procedure consent: procedure consent matches procedure scheduled  Relevant documents: relevant documents present and verified  Test results: test results available and properly labeled  Site marked: the operative site was marked  Patient identity confirmed: verbally with patient      Procedure Details:  Procedure type: cystoscopy    Patient tolerance: Patient tolerated the procedure well with no immediate complications    Additional Procedure Details: Office Cystoscopy Procedure Note    Indication:    Hematuria    Informed consent   The risks, benefits, complications, treatment options, and expected outcomes were discussed with the patient. The patient concurred with the proposed plan and provided informed consent.    Anesthesia  Lidocaine jelly 2%    Antibiotic prophylaxis   None    Procedure  The patient was placed in the supineposition, was prepped and draped in the usual manner using sterile technique, and 2% lidocaine jelly instilled into the urethra.  A 17 F flexible  cystoscope was then inserted into the urethra and the urethra and bladder carefully examined.  The following findings were noted:    Findings:  Urethra:  Normal  Prostate:  mild lateral lobe hypertrophy, no median lobe, no lesions  Bladder:  No trabeculations.   Ureteral orifices:  orthotopic  Other findings:  None, retroflexed view confirms    Specimens: None                 Complications:    None; patient tolerated the procedure well           Disposition: To home            Condition: Stable

## 2024-11-04 ENCOUNTER — PROCEDURE VISIT (OUTPATIENT)
Dept: UROLOGY | Facility: CLINIC | Age: 44
End: 2024-11-04
Payer: COMMERCIAL

## 2024-11-04 VITALS
OXYGEN SATURATION: 98 % | WEIGHT: 192 LBS | DIASTOLIC BLOOD PRESSURE: 60 MMHG | HEIGHT: 74 IN | HEART RATE: 87 BPM | SYSTOLIC BLOOD PRESSURE: 100 MMHG | BODY MASS INDEX: 24.64 KG/M2

## 2024-11-04 DIAGNOSIS — C64.1 RENAL CELL CARCINOMA OF RIGHT KIDNEY (HCC): ICD-10-CM

## 2024-11-04 DIAGNOSIS — R31.0 GROSS HEMATURIA: Primary | ICD-10-CM

## 2024-11-04 PROCEDURE — 99213 OFFICE O/P EST LOW 20 MIN: CPT

## 2024-11-04 PROCEDURE — 52000 CYSTOURETHROSCOPY: CPT

## 2024-11-04 NOTE — ASSESSMENT & PLAN NOTE
History of G2 ccRCC of the right kidney s/p R robotic-assisted laparoscopic partial nephrectomy.   No evidence of recurrent or metastatic disease.     - Will obtain CT abdomen, BMP, and CXR in 1 year as part of surveillance    Orders:    XR chest pa and lateral; Future    CT abdomen w wo contrast; Future

## 2024-11-04 NOTE — LETTER
2024     Mili Craven PA-C  [address] [facility]  [address] [place] [address]    Patient: Parker Rahman   YOB: 1980   Date of Visit: 2024         Thank you for referring Parker Rahman to me for evaluation. Below are my notes for this consultation.    If you have questions, please do not hesitate to call me.          Sincerely,        Piotr Lemos MD        CC: No Recipients    Piotr Lemos MD  2024 10:37 AM  Sign when Signing Visit  Ambulatory Visit  Name: Parker Rahman      : 1980      MRN: [id number]  Encounter Provider: Piotr Lemos MD  Encounter Date: 2024   Encounter department: [facility] UROLOGY [place]    Assessment & Plan  Gross hematuria  Gross hematuria was explained to the patient. His urine culture was negative so evaluation today is to rule out alternative causes.    Cystoscopy negative. Recent CT scans did not have delayed imaging but otherwise showed no concerning findings.     Gross hematuria now resolved. Possibly related to UTI. No entirely clear when his culture was collected relative to starting antibiotics.     - He will let us know if he has recurrence of gross hematuria. I would obtain delayed imaging at that point.     Orders:  •  Basic metabolic panel; Future    Renal cell carcinoma of right kidney (HCC)  History of G2 ccRCC of the right kidney s/p R robotic-assisted laparoscopic partial nephrectomy.   No evidence of recurrent or metastatic disease.     - Will obtain CT abdomen, BMP, and CXR in 1 year as part of surveillance    Orders:  •  XR chest pa and lateral; Future  •  CT abdomen w wo contrast; Future      History of Present Illness    Parker Rahman is a 44 y.o. male who presents for evaluation of gross hematuria. He has a history of renal cell carcinoma s/p robotic partial nephrectomy in . He has had no evidence of recurrent disease on imaging from this year.     He had painless gross hematuria in  "September 2024 for several days with a UA concerning for infection. He was treated with keflex and diflucan with resolution of his hematuria. Urine culture was no growth. CT scan showed bladder wall thickening concerning for cystitis.    History obtained from : patient    AUA SYMPTOM SCORE      Flowsheet Row Most Recent Value   AUA SYMPTOM SCORE    How often have you had a sensation of not emptying your bladder completely after you finished urinating? 1 (P)     How often have you had to urinate again less than two hours after you finished urinating? 3 (P)     How often have you found you stopped and started again several times when you urinate? 1 (P)     How often have you found it difficult to postpone urination? 0 (P)     How often have you had a weak urinary stream? 0 (P)     How often have you had to push or strain to begin urination? 0 (P)     How many times did you most typically get up to urinate from the time you went to bed at night until the time you got up in the morning? 3 (P)     Quality of Life: If you were to spend the rest of your life with your urinary condition just the way it is now, how would you feel about that? 3 (P)     AUA SYMPTOM SCORE 8 (P)              Objective    /60 (BP Location: Left arm, Patient Position: Sitting, Cuff Size: Adult)   Pulse 87   Ht 6' 2\" (1.88 m)   Wt 87.1 kg (192 lb)   SpO2 98%   BMI 24.65 kg/m²   Physical Exam  Vitals:    11/04/24 0959   BP: 100/60   Pulse: 87   SpO2: 98%      General: Well appearing, no acute distress   HEENT: normocephalic, atraumatic  Eyes: extraocular movements intact  Cardiovascular: normal rate   Respiratory: no respiratory distress, effort normal   Abdominal: Non-distended, non-tender   : See cystoscopy note  MSK: Grossly normal range of motion   Neurological: No gross focal deficits  Behavioral: Normal mood and affect     Results  Lab Results   Component Value Date    PSA 0.42 08/16/2023     Lab Results   Component Value Date    " CALCIUM 10.0 09/20/2024    K 3.8 09/20/2024    CO2 31 09/20/2024    CL 98 09/20/2024    BUN 18 09/20/2024    CREATININE 1.04 09/20/2024     Lab Results   Component Value Date    WBC 4.30 (L) 09/20/2024    HGB 13.7 09/20/2024    HCT 39.3 09/20/2024    MCV 87 09/20/2024     09/20/2024       Imaging  I have personally reviewed pertinent films in PACS.    CT ABDOMEN PELVIS W CONTRAST  1.  Urinary bladder wall appears mildly thickened and trabeculated, even when accounting for degree of underdistention. This could relate to chronic partial outlet obstruction by the prostate though infectious cystitis can cause a similar appearance.    2.  Otherwise, no acute abdominopelvic findings or abnormalities to explain hematuria. Specifically, no recurrent renal masses.      Workstation performed: [id number]      Office Urine Dip  No results found for this or any previous visit (from the past 1 hour(s)).]    Administrative Statements       Cystoscopy     Date/Time  11/4/2024 10:00 AM     Performed by  Piotr Lemos MD   Authorized by  Piotr Lemos MD     Universal Protocol:  procedure performed by consultantConsent: Verbal consent obtained. Written consent obtained.  Risks and benefits: risks, benefits and alternatives were discussed  Consent given by: patient  Patient understanding: patient states understanding of the procedure being performed  Patient consent: the patient's understanding of the procedure matches consent given  Procedure consent: procedure consent matches procedure scheduled  Relevant documents: relevant documents present and verified  Test results: test results available and properly labeled  Site marked: the operative site was marked  Patient identity confirmed: verbally with patient      Procedure Details:  Procedure type: cystoscopy    Patient tolerance: Patient tolerated the procedure well with no immediate complications    Additional Procedure Details: Office Cystoscopy Procedure  Note    Indication:    Hematuria    Informed consent   The risks, benefits, complications, treatment options, and expected outcomes were discussed with the patient. The patient concurred with the proposed plan and provided informed consent.    Anesthesia  Lidocaine jelly 2%    Antibiotic prophylaxis   None    Procedure  The patient was placed in the supineposition, was prepped and draped in the usual manner using sterile technique, and 2% lidocaine jelly instilled into the urethra.  A 17 F flexible cystoscope was then inserted into the urethra and the urethra and bladder carefully examined.  The following findings were noted:    Findings:  Urethra:  Normal  Prostate:  mild lateral lobe hypertrophy, no median lobe, no lesions  Bladder:  No trabeculations.   Ureteral orifices:  orthotopic  Other findings:  None, retroflexed view confirms    Specimens: None                 Complications:    None; patient tolerated the procedure well           Disposition: To home            Condition: Stable

## 2024-11-04 NOTE — ASSESSMENT & PLAN NOTE
Gross hematuria was explained to the patient. His urine culture was negative so evaluation today is to rule out alternative causes.    Cystoscopy negative. Recent CT scans did not have delayed imaging but otherwise showed no concerning findings.     Gross hematuria now resolved. Possibly related to UTI. No entirely clear when his culture was collected relative to starting antibiotics.     - He will let us know if he has recurrence of gross hematuria. I would obtain delayed imaging at that point.     Orders:    Basic metabolic panel; Future

## (undated) DEVICE — AIRSEAL TUBE SMOKE EVAC LUMENX3 FILTERED

## (undated) DEVICE — MARYLAND BIPOLAR FORCEPS: Brand: ENDOWRIST

## (undated) DEVICE — JACKSON-PRATT 100CC BULB RESERVOIR: Brand: CARDINAL HEALTH

## (undated) DEVICE — IRRIG ENDO FLO TUBING

## (undated) DEVICE — SUT MONOCRYL 4-0 PS-2 27 IN Y426H

## (undated) DEVICE — TIP COVER ACCESSORY

## (undated) DEVICE — HEM-O-LOK CLIP CARTRIDGE LARGE DA VINCI SI/XI

## (undated) DEVICE — TROCAR PORT ACCESS 12 X120MM W/BLDLS OPTICAL TIP AIRSEAL

## (undated) DEVICE — SUT PROLENE 4-0 RB-1/RB-1 36 IN 8557H

## (undated) DEVICE — 3000CC GUARDIAN II: Brand: GUARDIAN

## (undated) DEVICE — INTENDED FOR TISSUE SEPARATION, AND OTHER PROCEDURES THAT REQUIRE A SHARP SURGICAL BLADE TO PUNCTURE OR CUT.: Brand: BARD-PARKER SAFETY BLADES SIZE 11, STERILE

## (undated) DEVICE — ARM DRAPE

## (undated) DEVICE — PROGRASP FORCEPS: Brand: ENDOWRIST

## (undated) DEVICE — ASTOUND STANDARD SURGICAL GOWN, XL: Brand: CONVERTORS

## (undated) DEVICE — LARGE NEEDLE DRIVER: Brand: ENDOWRIST

## (undated) DEVICE — HEMOSTATIC MATRIX SURGIFLO 8ML W/THROMBIN

## (undated) DEVICE — VISUALIZATION SYSTEM: Brand: CLEARIFY

## (undated) DEVICE — LUBRICANT INST ELECTROLUBE ANTISTK WO PAD

## (undated) DEVICE — DRAPE LAPAROTOMY W/POUCHES

## (undated) DEVICE — SUT SILK 0 30 IN A306H

## (undated) DEVICE — SUT STRATAFIX SPIRAL 1 CT-1 24 X 24CM SXPL2B400

## (undated) DEVICE — RENTAL PROBE ULTRASOUND DROP IN BK5000

## (undated) DEVICE — SURGICEL 4 X 8

## (undated) DEVICE — SUT ETHILON 3-0 FS-1 18 IN 663G

## (undated) DEVICE — BETHLEHEM UNIVERSAL LAPAROTOMY: Brand: CARDINAL HEALTH

## (undated) DEVICE — JP CHANNEL DRAIN 19FR, FULL FLUTES: Brand: JACKSON-PRATT

## (undated) DEVICE — SUT SILK 2-0 SH 30 IN K833H

## (undated) DEVICE — DRAPE EQUIPMENT RF WAND

## (undated) DEVICE — TRAY FOLEY 16FR URIMETER SURESTEP

## (undated) DEVICE — COLUMN DRAPE

## (undated) DEVICE — MONOPOLAR CURVED SCISSORS: Brand: ENDOWRIST

## (undated) DEVICE — ADHESIVE SKIN HIGH VISCOSITY EXOFIN 1ML

## (undated) DEVICE — GLOVE SRG BIOGEL 8

## (undated) DEVICE — 3M™ IOBAN™ 2 ANTIMICROBIAL INCISE DRAPE 6650EZ: Brand: IOBAN™ 2

## (undated) DEVICE — CANNULA SEAL

## (undated) DEVICE — SUT VLOC 90 3-0 V-20 9IN VLOCM0644

## (undated) DEVICE — DRAPE SHEET X-LG

## (undated) DEVICE — GLOVE INDICATOR PI UNDERGLOVE SZ 8 BLUE

## (undated) DEVICE — ENDOPATH PNEUMONEEDLE INSUFFLATION NEEDLES WITH LUER LOCK CONNECTORS 120MM: Brand: ENDOPATH

## (undated) DEVICE — TRANSDUCER ROBOTIC DROP IN